# Patient Record
Sex: MALE | Race: WHITE
[De-identification: names, ages, dates, MRNs, and addresses within clinical notes are randomized per-mention and may not be internally consistent; named-entity substitution may affect disease eponyms.]

---

## 2017-03-09 ENCOUNTER — HOSPITAL ENCOUNTER (OUTPATIENT)
Dept: HOSPITAL 19 - SDCO | Age: 82
Discharge: HOME | End: 2017-03-09
Attending: SURGERY
Payer: MEDICARE

## 2017-03-09 VITALS — DIASTOLIC BLOOD PRESSURE: 47 MMHG | HEART RATE: 82 BPM | SYSTOLIC BLOOD PRESSURE: 121 MMHG

## 2017-03-09 VITALS — WEIGHT: 178.13 LBS | HEIGHT: 70.5 IN | BODY MASS INDEX: 25.22 KG/M2

## 2017-03-09 VITALS — SYSTOLIC BLOOD PRESSURE: 102 MMHG | HEART RATE: 76 BPM | DIASTOLIC BLOOD PRESSURE: 50 MMHG | TEMPERATURE: 98.8 F

## 2017-03-09 VITALS — DIASTOLIC BLOOD PRESSURE: 58 MMHG | HEART RATE: 72 BPM | SYSTOLIC BLOOD PRESSURE: 118 MMHG

## 2017-03-09 VITALS — TEMPERATURE: 97.3 F | HEART RATE: 67 BPM | DIASTOLIC BLOOD PRESSURE: 74 MMHG | SYSTOLIC BLOOD PRESSURE: 148 MMHG

## 2017-03-09 DIAGNOSIS — C44.529: Primary | ICD-10-CM

## 2017-05-16 ENCOUNTER — HOSPITAL ENCOUNTER (OUTPATIENT)
Dept: HOSPITAL 19 - WSPT | Age: 82
Discharge: HOME | End: 2017-05-16
Attending: INTERNAL MEDICINE
Payer: MEDICARE

## 2017-05-16 DIAGNOSIS — M25.561: Primary | ICD-10-CM

## 2017-05-16 DIAGNOSIS — M25.562: ICD-10-CM

## 2017-06-14 ENCOUNTER — HOSPITAL ENCOUNTER (OUTPATIENT)
Dept: HOSPITAL 19 - COL.LAB | Age: 82
End: 2017-06-14
Attending: ORTHOPAEDIC SURGERY
Payer: MEDICARE

## 2017-06-14 DIAGNOSIS — Z96.651: Primary | ICD-10-CM

## 2017-06-14 LAB
ERYTHROCYTE [DISTWIDTH] IN BLOOD BY AUTOMATED COUNT: 14.2 % (ref 11.5–14.5)
ERYTHROCYTE [SEDIMENTATION RATE] IN BLOOD: 1 MM/HR (ref 0–30)
HCT VFR BLD AUTO: 44.2 % (ref 42–52)
HGB BLD-MCNC: 13.7 G/DL (ref 13.5–18)
MCH RBC QN AUTO: 27 PG (ref 27–31)
MCHC RBC AUTO-ENTMCNC: 31 G/DL (ref 33–37)
MCV RBC AUTO: 87 FL (ref 80–100)
PLATELET # BLD AUTO: 197 K/MM3 (ref 130–400)
PMV BLD AUTO: 11 FL (ref 7.4–10.4)
RBC # BLD AUTO: 5.09 M/MM3 (ref 4.2–5.6)
WBC # BLD AUTO: 5.9 K/MM3 (ref 4.8–10.8)

## 2017-07-22 ENCOUNTER — HOSPITAL ENCOUNTER (EMERGENCY)
Dept: HOSPITAL 19 - COL.ER | Age: 82
Discharge: HOME | End: 2017-07-22
Payer: MEDICARE

## 2017-07-22 VITALS — HEIGHT: 70.98 IN | BODY MASS INDEX: 25.25 KG/M2 | WEIGHT: 180.34 LBS

## 2017-07-22 VITALS — SYSTOLIC BLOOD PRESSURE: 108 MMHG | HEART RATE: 78 BPM | DIASTOLIC BLOOD PRESSURE: 56 MMHG

## 2017-07-22 VITALS — TEMPERATURE: 97.9 F

## 2017-07-22 DIAGNOSIS — R53.1: Primary | ICD-10-CM

## 2017-07-22 DIAGNOSIS — R42: ICD-10-CM

## 2017-07-22 DIAGNOSIS — I10: ICD-10-CM

## 2017-07-22 LAB
ANION GAP SERPL CALC-SCNC: 9 MMOL/L (ref 7–16)
BUN SERPL-MCNC: 21 MG/DL (ref 9–20)
CALCIUM SERPL-MCNC: 9.1 MG/DL (ref 8.4–10.2)
CHLORIDE SERPL-SCNC: 105 MMOL/L (ref 98–107)
CO2 SERPL-SCNC: 26 MMOL/L (ref 22–30)
CREAT SERPL-SCNC: 0.95 MG/DL (ref 0.66–1.25)
GLUCOSE SERPL-MCNC: 216 MG/DL (ref 74–106)
POTASSIUM SERPL-SCNC: 4.1 MMOL/L (ref 3.4–5)
SODIUM SERPL-SCNC: 140 MMOL/L (ref 137–145)

## 2017-08-10 ENCOUNTER — HOSPITAL ENCOUNTER (OUTPATIENT)
Dept: HOSPITAL 19 - COL.VAS | Age: 82
End: 2017-08-10
Attending: INTERNAL MEDICINE
Payer: MEDICARE

## 2017-08-10 DIAGNOSIS — R59.0: Primary | ICD-10-CM

## 2017-08-10 DIAGNOSIS — R60.0: ICD-10-CM

## 2017-10-26 ENCOUNTER — HOSPITAL ENCOUNTER (OUTPATIENT)
Dept: HOSPITAL 19 - COL.RAD | Age: 82
End: 2017-10-26
Attending: NURSE PRACTITIONER
Payer: MEDICARE

## 2017-10-26 DIAGNOSIS — H53.8: ICD-10-CM

## 2017-10-26 DIAGNOSIS — S09.90XA: Primary | ICD-10-CM

## 2017-10-26 DIAGNOSIS — R41.840: ICD-10-CM

## 2017-11-13 ENCOUNTER — HOSPITAL ENCOUNTER (EMERGENCY)
Dept: HOSPITAL 19 - COL.ER | Age: 82
Discharge: HOME | End: 2017-11-13
Payer: MEDICARE

## 2017-11-13 VITALS — BODY MASS INDEX: 25.82 KG/M2 | WEIGHT: 180.34 LBS | HEIGHT: 70 IN

## 2017-11-13 VITALS — DIASTOLIC BLOOD PRESSURE: 72 MMHG | HEART RATE: 64 BPM | TEMPERATURE: 98.6 F | SYSTOLIC BLOOD PRESSURE: 135 MMHG

## 2017-11-13 DIAGNOSIS — E78.5: ICD-10-CM
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
## 2017-11-13 DIAGNOSIS — I10: ICD-10-CM

## 2017-11-13 DIAGNOSIS — N40.0: ICD-10-CM

## 2017-11-13 DIAGNOSIS — Z79.82: ICD-10-CM

## 2017-11-13 DIAGNOSIS — S86.912A: Primary | ICD-10-CM
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
## 2018-05-11 ENCOUNTER — HOSPITAL ENCOUNTER (OUTPATIENT)
Dept: HOSPITAL 19 - WSPT | Age: 83
LOS: 2 days | Discharge: HOME | End: 2018-05-13
Attending: INTERNAL MEDICINE
Payer: MEDICARE

## 2018-05-11 DIAGNOSIS — M19.90: Primary | ICD-10-CM

## 2018-06-09 ENCOUNTER — HOSPITAL ENCOUNTER (EMERGENCY)
Dept: HOSPITAL 19 - COL.ER | Age: 83
Discharge: HOME | End: 2018-06-09
Payer: MEDICARE

## 2018-06-09 VITALS — WEIGHT: 175.27 LBS | BODY MASS INDEX: 25.09 KG/M2 | HEIGHT: 70 IN

## 2018-06-09 VITALS — DIASTOLIC BLOOD PRESSURE: 71 MMHG | HEART RATE: 82 BPM | TEMPERATURE: 98.1 F | SYSTOLIC BLOOD PRESSURE: 136 MMHG

## 2018-06-09 DIAGNOSIS — H61.23: Primary | ICD-10-CM

## 2018-06-24 ENCOUNTER — HOSPITAL ENCOUNTER (EMERGENCY)
Dept: HOSPITAL 19 - COL.ER | Age: 83
LOS: 1 days | Discharge: HOME | End: 2018-06-25
Payer: MEDICARE

## 2018-06-24 VITALS — TEMPERATURE: 97.8 F

## 2018-06-24 VITALS — HEIGHT: 70 IN | BODY MASS INDEX: 25.09 KG/M2 | WEIGHT: 175.27 LBS

## 2018-06-24 DIAGNOSIS — I48.91: ICD-10-CM

## 2018-06-24 DIAGNOSIS — Z98.890: ICD-10-CM

## 2018-06-24 DIAGNOSIS — Z79.82: ICD-10-CM

## 2018-06-24 DIAGNOSIS — E78.5: ICD-10-CM

## 2018-06-24 DIAGNOSIS — I10: ICD-10-CM

## 2018-06-24 DIAGNOSIS — R42: Primary | ICD-10-CM

## 2018-06-24 DIAGNOSIS — N40.0: ICD-10-CM

## 2018-06-24 LAB
BASOPHILS # BLD: 0 10*3/UL (ref 0–0.2)
BASOPHILS NFR BLD AUTO: 0.5 % (ref 0–2)
EOSINOPHIL # BLD: 0.3 10*3/UL (ref 0–0.7)
EOSINOPHIL NFR BLD: 4.5 % (ref 0–4)
ERYTHROCYTE [DISTWIDTH] IN BLOOD BY AUTOMATED COUNT: 13.8 % (ref 11.5–14.5)
GRANULOCYTES # BLD AUTO: 52.4 % (ref 42.2–75.2)
HCT VFR BLD AUTO: 42.5 % (ref 42–52)
HGB BLD-MCNC: 13.3 G/DL (ref 13.5–18)
INR BLD: 1.1 (ref 0.8–3)
LYMPHOCYTES # BLD: 1.7 10*3/UL (ref 1.2–3.4)
LYMPHOCYTES NFR BLD: 26.6 % (ref 20–51)
MCH RBC QN AUTO: 27 PG (ref 27–31)
MCHC RBC AUTO-ENTMCNC: 31 G/DL (ref 33–37)
MCV RBC AUTO: 85 FL (ref 80–100)
MONOCYTES # BLD: 1 10*3/UL (ref 0.1–0.6)
MONOCYTES NFR BLD AUTO: 15.5 % (ref 1.7–9.3)
NEUTROPHILS # BLD: 3.3 10*3/UL (ref 1.4–6.5)
PLATELET # BLD AUTO: 214 K/MM3 (ref 130–400)
PMV BLD AUTO: 10.8 FL (ref 7.4–10.4)
PROTHROMBIN TIME: 13 SECONDS (ref 9.7–12.8)
RBC # BLD AUTO: 4.99 M/MM3 (ref 4.2–5.6)

## 2018-06-25 VITALS — HEART RATE: 67 BPM | DIASTOLIC BLOOD PRESSURE: 60 MMHG | SYSTOLIC BLOOD PRESSURE: 123 MMHG

## 2018-06-25 LAB
ALBUMIN SERPL-MCNC: 3.5 GM/DL (ref 3.5–5)
ALP SERPL-CCNC: 98 U/L (ref 50–136)
ALT SERPL-CCNC: 28 U/L (ref 21–72)
ANION GAP SERPL CALC-SCNC: 10 MMOL/L (ref 7–16)
AST SERPL-CCNC: 32 U/L (ref 15–37)
BILIRUB SERPL-MCNC: 0.6 MG/DL (ref 0–1)
BUN SERPL-MCNC: 19 MG/DL (ref 9–20)
CALCIUM SERPL-MCNC: 8.9 MG/DL (ref 8.4–10.2)
CHLORIDE SERPL-SCNC: 102 MMOL/L (ref 98–107)
CO2 SERPL-SCNC: 26 MMOL/L (ref 22–30)
CREAT SERPL-SCNC: 0.88 MG/DL (ref 0.66–1.25)
GLUCOSE SERPL-MCNC: 87 MG/DL (ref 74–106)
POTASSIUM SERPL-SCNC: 4.1 MMOL/L (ref 3.4–5)
PROT SERPL-MCNC: 6.7 GM/DL (ref 6.4–8.2)
SODIUM SERPL-SCNC: 139 MMOL/L (ref 137–145)
TROPONIN I SERPL-MCNC: < 0.012 NG/ML (ref 0–0.03)

## 2018-07-03 ENCOUNTER — HOSPITAL ENCOUNTER (OUTPATIENT)
Dept: HOSPITAL 19 - COL.RAD | Age: 83
End: 2018-07-03
Attending: ORTHOPAEDIC SURGERY
Payer: MEDICARE

## 2018-07-03 DIAGNOSIS — G31.89: Primary | ICD-10-CM

## 2018-07-03 PROCEDURE — A9585 GADOBUTROL INJECTION: HCPCS

## 2018-07-13 ENCOUNTER — HOSPITAL ENCOUNTER (OUTPATIENT)
Dept: HOSPITAL 19 - WSPT | Age: 83
LOS: 37 days | Discharge: HOME | End: 2018-08-19
Attending: INTERNAL MEDICINE
Payer: MEDICARE

## 2018-07-13 DIAGNOSIS — R29.898: Primary | ICD-10-CM

## 2018-07-13 DIAGNOSIS — R27.0: ICD-10-CM

## 2018-10-26 ENCOUNTER — HOSPITAL ENCOUNTER (OUTPATIENT)
Dept: HOSPITAL 19 - COL.LAB | Age: 83
End: 2018-10-26
Attending: INTERNAL MEDICINE
Payer: MEDICARE

## 2018-10-26 DIAGNOSIS — I49.5: Primary | ICD-10-CM

## 2018-10-26 LAB
ANION GAP SERPL CALC-SCNC: 3 MMOL/L (ref 7–16)
BUN SERPL-MCNC: 17 MG/DL (ref 9–20)
CALCIUM SERPL-MCNC: 8.6 MG/DL (ref 8.4–10.2)
CHLORIDE SERPL-SCNC: 105 MMOL/L (ref 98–107)
CO2 SERPL-SCNC: 32 MMOL/L (ref 22–30)
CREAT SERPL-SCNC: 0.75 MG/DL (ref 0.66–1.25)
ERYTHROCYTE [DISTWIDTH] IN BLOOD BY AUTOMATED COUNT: 13.2 % (ref 11.5–14.5)
GLUCOSE SERPL-MCNC: 90 MG/DL (ref 74–106)
HCT VFR BLD AUTO: 42.5 % (ref 42–52)
HGB BLD-MCNC: 13.3 G/DL (ref 13.5–18)
INR BLD: 1.2 (ref 0.8–3)
MCH RBC QN AUTO: 27 PG (ref 27–31)
MCHC RBC AUTO-ENTMCNC: 31 G/DL (ref 33–37)
MCV RBC AUTO: 88 FL (ref 80–100)
PLATELET # BLD AUTO: 181 K/MM3 (ref 130–400)
PMV BLD AUTO: 10.6 FL (ref 7.4–10.4)
POTASSIUM SERPL-SCNC: 4.5 MMOL/L (ref 3.4–5)
PROTHROMBIN TIME: 13.5 SECONDS (ref 9.7–12.8)
RBC # BLD AUTO: 4.85 M/MM3 (ref 4.2–5.6)
SODIUM SERPL-SCNC: 140 MMOL/L (ref 137–145)

## 2018-10-31 ENCOUNTER — HOSPITAL ENCOUNTER (OUTPATIENT)
Dept: HOSPITAL 19 - COL.RAD | Age: 83
End: 2018-10-31
Attending: INTERNAL MEDICINE
Payer: MEDICARE

## 2018-10-31 DIAGNOSIS — Z95.0: ICD-10-CM

## 2018-10-31 DIAGNOSIS — J98.6: Primary | ICD-10-CM

## 2018-12-18 ENCOUNTER — HOSPITAL ENCOUNTER (OUTPATIENT)
Dept: HOSPITAL 19 - COL.RAD | Age: 83
End: 2018-12-18
Attending: INTERNAL MEDICINE
Payer: MEDICARE

## 2018-12-18 DIAGNOSIS — M46.94: ICD-10-CM

## 2018-12-18 DIAGNOSIS — Z95.0: ICD-10-CM

## 2018-12-18 DIAGNOSIS — J84.10: Primary | ICD-10-CM

## 2018-12-18 DIAGNOSIS — J98.6: ICD-10-CM

## 2019-05-01 ENCOUNTER — HOSPITAL ENCOUNTER (OUTPATIENT)
Dept: HOSPITAL 19 - MKS.ESL.PT | Age: 84
LOS: 78 days | Discharge: HOME | End: 2019-07-18
Attending: ORTHOPAEDIC SURGERY
Payer: MEDICARE

## 2019-05-01 DIAGNOSIS — M25.562: ICD-10-CM

## 2019-05-01 DIAGNOSIS — Z96.651: ICD-10-CM

## 2019-05-01 DIAGNOSIS — M25.561: Primary | ICD-10-CM

## 2019-06-07 ENCOUNTER — HOSPITAL ENCOUNTER (OUTPATIENT)
Dept: HOSPITAL 19 - COL.RAD | Age: 84
End: 2019-06-07
Attending: ORTHOPAEDIC SURGERY
Payer: MEDICARE

## 2019-06-07 DIAGNOSIS — S76.012A: Primary | ICD-10-CM

## 2019-08-02 ENCOUNTER — HOSPITAL ENCOUNTER (EMERGENCY)
Dept: HOSPITAL 19 - COL.ER | Age: 84
Discharge: HOME | End: 2019-08-02
Payer: MEDICARE

## 2019-08-02 VITALS — SYSTOLIC BLOOD PRESSURE: 151 MMHG | HEART RATE: 85 BPM | DIASTOLIC BLOOD PRESSURE: 87 MMHG

## 2019-08-02 VITALS — TEMPERATURE: 97.5 F

## 2019-08-02 VITALS — BODY MASS INDEX: 25.09 KG/M2 | WEIGHT: 175.27 LBS | HEIGHT: 70 IN

## 2019-08-02 DIAGNOSIS — Z79.01: ICD-10-CM

## 2019-08-02 DIAGNOSIS — R42: ICD-10-CM

## 2019-08-02 DIAGNOSIS — I10: ICD-10-CM

## 2019-08-02 DIAGNOSIS — Z79.82: ICD-10-CM

## 2019-08-02 DIAGNOSIS — I48.91: ICD-10-CM

## 2019-08-02 DIAGNOSIS — E78.00: ICD-10-CM

## 2019-08-02 DIAGNOSIS — R53.1: Primary | ICD-10-CM

## 2019-08-02 DIAGNOSIS — Z95.0: ICD-10-CM

## 2019-08-02 LAB
ALBUMIN SERPL-MCNC: 3.9 GM/DL (ref 3.5–5)
ALP SERPL-CCNC: 102 U/L (ref 50–136)
ALT SERPL-CCNC: 20 U/L (ref 21–72)
ANION GAP SERPL CALC-SCNC: 8 MMOL/L (ref 7–16)
AST SERPL-CCNC: 34 U/L (ref 15–37)
BASOPHILS # BLD: 0 10*3/UL (ref 0–0.2)
BASOPHILS NFR BLD AUTO: 0.4 % (ref 0–2)
BILIRUB SERPL-MCNC: 0.8 MG/DL (ref 0–1)
BUN SERPL-MCNC: 14 MG/DL (ref 9–20)
CALCIUM SERPL-MCNC: 9 MG/DL (ref 8.4–10.2)
CHLORIDE SERPL-SCNC: 105 MMOL/L (ref 98–107)
CO2 SERPL-SCNC: 29 MMOL/L (ref 22–30)
CREAT SERPL-SCNC: 0.9 UMOL/L (ref 0.66–1.25)
CRP SERPL-MCNC: 0.5 MG/DL (ref 0–0.9)
EOSINOPHIL # BLD: 0.3 10*3/UL (ref 0–0.7)
EOSINOPHIL NFR BLD: 3.9 % (ref 0–4)
ERYTHROCYTE [DISTWIDTH] IN BLOOD BY AUTOMATED COUNT: 13.3 % (ref 11.5–14.5)
GLUCOSE SERPL-MCNC: 91 MG/DL (ref 74–106)
GRANULOCYTES # BLD AUTO: 63.1 % (ref 42.2–75.2)
HCT VFR BLD AUTO: 42.3 % (ref 42–52)
HGB BLD-MCNC: 13 G/DL (ref 13.5–18)
LYMPHOCYTES # BLD: 1.4 10*3/UL (ref 1.2–3.4)
LYMPHOCYTES NFR BLD: 20.4 % (ref 20–51)
MCH RBC QN AUTO: 27 PG (ref 27–31)
MCHC RBC AUTO-ENTMCNC: 31 G/DL (ref 33–37)
MCV RBC AUTO: 87 FL (ref 80–100)
MONOCYTES # BLD: 0.8 10*3/UL (ref 0.1–0.6)
MONOCYTES NFR BLD AUTO: 12.1 % (ref 1.7–9.3)
NEUTROPHILS # BLD: 4.4 10*3/UL (ref 1.4–6.5)
PH UR STRIP.AUTO: 5 [PH] (ref 5–8)
PLATELET # BLD AUTO: 178 K/MM3 (ref 130–400)
PMV BLD AUTO: 11.1 FL (ref 7.4–10.4)
POTASSIUM SERPL-SCNC: 4.2 MMOL/L (ref 3.4–5)
PROT SERPL-MCNC: 6.9 GM/DL (ref 6.4–8.2)
RBC # BLD AUTO: 4.88 M/MM3 (ref 4.2–5.6)
RBC # UR: (no result) /HPF
SODIUM SERPL-SCNC: 141 MMOL/L (ref 137–145)
SP GR UR STRIP.AUTO: 1.01 (ref 1–1.03)
TROPONIN I SERPL-MCNC: < 0.012 NG/ML (ref 0–0.04)
URN COLLECT METHOD CLASS: (no result)

## 2019-12-27 ENCOUNTER — HOSPITAL ENCOUNTER (EMERGENCY)
Dept: HOSPITAL 19 - COL.ER | Age: 84
LOS: 1 days | Discharge: HOME | End: 2019-12-28
Payer: MEDICARE

## 2019-12-27 VITALS — BODY MASS INDEX: 24.81 KG/M2 | WEIGHT: 173.28 LBS | HEIGHT: 70 IN

## 2019-12-27 DIAGNOSIS — Z96.651: ICD-10-CM

## 2019-12-27 DIAGNOSIS — Z79.82: ICD-10-CM

## 2019-12-27 DIAGNOSIS — Z95.0: ICD-10-CM

## 2019-12-27 DIAGNOSIS — E78.5: ICD-10-CM

## 2019-12-27 DIAGNOSIS — I25.10: ICD-10-CM

## 2019-12-27 DIAGNOSIS — J11.1: Primary | ICD-10-CM

## 2019-12-27 DIAGNOSIS — Z86.73: ICD-10-CM

## 2019-12-27 DIAGNOSIS — I10: ICD-10-CM

## 2019-12-27 LAB
ALBUMIN SERPL-MCNC: 3.9 GM/DL (ref 3.5–5)
ALP SERPL-CCNC: 114 U/L (ref 50–136)
ALT SERPL-CCNC: 34 U/L (ref 21–72)
ANION GAP SERPL CALC-SCNC: 9 MMOL/L (ref 7–16)
AST SERPL-CCNC: 53 U/L (ref 15–37)
BASOPHILS # BLD: 0 10*3/UL (ref 0–0.2)
BASOPHILS NFR BLD AUTO: 0.3 % (ref 0–2)
BILIRUB SERPL-MCNC: 0.7 MG/DL (ref 0–1)
BUN SERPL-MCNC: 15 MG/DL (ref 9–20)
CALCIUM SERPL-MCNC: 8.9 MG/DL (ref 8.4–10.2)
CHLORIDE SERPL-SCNC: 101 MMOL/L (ref 98–107)
CO2 SERPL-SCNC: 29 MMOL/L (ref 22–30)
CREAT SERPL-SCNC: 0.99 UMOL/L (ref 0.66–1.25)
CRP SERPL-MCNC: 16.9 MG/DL (ref 0–0.9)
EOSINOPHIL # BLD: 0.1 10*3/UL (ref 0–0.7)
EOSINOPHIL NFR BLD: 0.9 % (ref 0–4)
ERYTHROCYTE [DISTWIDTH] IN BLOOD BY AUTOMATED COUNT: 13.2 % (ref 11.5–14.5)
GLUCOSE SERPL-MCNC: 96 MG/DL (ref 74–106)
GRANULOCYTES # BLD AUTO: 73.8 % (ref 42.2–75.2)
HCT VFR BLD AUTO: 41.3 % (ref 42–52)
HGB BLD-MCNC: 12.6 G/DL (ref 13.5–18)
LYMPHOCYTES # BLD: 0.9 10*3/UL (ref 1.2–3.4)
LYMPHOCYTES NFR BLD: 9.4 % (ref 20–51)
MCH RBC QN AUTO: 27 PG (ref 27–31)
MCHC RBC AUTO-ENTMCNC: 31 G/DL (ref 33–37)
MCV RBC AUTO: 87 FL (ref 80–100)
MONOCYTES # BLD: 1.5 10*3/UL (ref 0.1–0.6)
MONOCYTES NFR BLD AUTO: 15.3 % (ref 1.7–9.3)
NEUTROPHILS # BLD: 7.2 10*3/UL (ref 1.4–6.5)
PLATELET # BLD AUTO: 225 K/MM3 (ref 130–400)
PMV BLD AUTO: 10.4 FL (ref 7.4–10.4)
POTASSIUM SERPL-SCNC: 4.4 MMOL/L (ref 3.4–5)
PROT SERPL-MCNC: 7.2 GM/DL (ref 6.4–8.2)
RBC # BLD AUTO: 4.73 M/MM3 (ref 4.2–5.6)
SODIUM SERPL-SCNC: 138 MMOL/L (ref 137–145)

## 2019-12-28 VITALS — SYSTOLIC BLOOD PRESSURE: 100 MMHG | DIASTOLIC BLOOD PRESSURE: 56 MMHG | HEART RATE: 84 BPM | TEMPERATURE: 97.6 F

## 2019-12-28 LAB
PH UR STRIP.AUTO: 5 [PH] (ref 5–8)
RBC # UR: (no result) /HPF
SP GR UR STRIP.AUTO: 1.01 (ref 1–1.03)
URN COLLECT METHOD CLASS: (no result)

## 2020-06-10 ENCOUNTER — HOSPITAL ENCOUNTER (OUTPATIENT)
Dept: HOSPITAL 19 - WSPT | Age: 85
LOS: 90 days | End: 2020-09-08
Attending: FAMILY MEDICINE
Payer: MEDICARE

## 2020-06-10 DIAGNOSIS — M25.561: Primary | ICD-10-CM

## 2020-06-10 DIAGNOSIS — M25.562: ICD-10-CM

## 2020-10-01 ENCOUNTER — HOSPITAL ENCOUNTER (INPATIENT)
Dept: HOSPITAL 19 - COL.ER | Age: 85
LOS: 15 days | Discharge: NURSING, REHAB OR LONG-TERM CARE | DRG: 417 | End: 2020-10-16
Attending: INTERNAL MEDICINE | Admitting: INTERNAL MEDICINE
Payer: MEDICARE

## 2020-10-01 VITALS — HEART RATE: 83 BPM | TEMPERATURE: 97.8 F | SYSTOLIC BLOOD PRESSURE: 138 MMHG | DIASTOLIC BLOOD PRESSURE: 62 MMHG

## 2020-10-01 VITALS — SYSTOLIC BLOOD PRESSURE: 152 MMHG | TEMPERATURE: 98.5 F | HEART RATE: 83 BPM | DIASTOLIC BLOOD PRESSURE: 67 MMHG

## 2020-10-01 VITALS — DIASTOLIC BLOOD PRESSURE: 86 MMHG | SYSTOLIC BLOOD PRESSURE: 162 MMHG | HEART RATE: 78 BPM | TEMPERATURE: 97.8 F

## 2020-10-01 VITALS — HEIGHT: 70.98 IN | WEIGHT: 192.24 LBS | BODY MASS INDEX: 26.91 KG/M2

## 2020-10-01 VITALS — TEMPERATURE: 98.7 F | HEART RATE: 83 BPM | SYSTOLIC BLOOD PRESSURE: 154 MMHG | DIASTOLIC BLOOD PRESSURE: 70 MMHG

## 2020-10-01 DIAGNOSIS — J96.01: ICD-10-CM

## 2020-10-01 DIAGNOSIS — L76.32: ICD-10-CM
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
## 2020-10-01 DIAGNOSIS — N40.1: ICD-10-CM

## 2020-10-01 DIAGNOSIS — Z79.01: ICD-10-CM

## 2020-10-01 DIAGNOSIS — R73.03: ICD-10-CM

## 2020-10-01 DIAGNOSIS — G47.00: ICD-10-CM

## 2020-10-01 DIAGNOSIS — I10: ICD-10-CM

## 2020-10-01 DIAGNOSIS — K85.10: Primary | ICD-10-CM

## 2020-10-01 DIAGNOSIS — N17.9: ICD-10-CM

## 2020-10-01 DIAGNOSIS — D64.9: ICD-10-CM

## 2020-10-01 DIAGNOSIS — D47.3: ICD-10-CM

## 2020-10-01 DIAGNOSIS — E87.6: ICD-10-CM

## 2020-10-01 DIAGNOSIS — R94.31: ICD-10-CM

## 2020-10-01 DIAGNOSIS — E78.5: ICD-10-CM

## 2020-10-01 DIAGNOSIS — I48.91: ICD-10-CM

## 2020-10-01 DIAGNOSIS — Z95.0: ICD-10-CM

## 2020-10-01 DIAGNOSIS — N32.81: ICD-10-CM

## 2020-10-01 LAB
ALBUMIN SERPL-MCNC: 4.3 GM/DL (ref 3.5–5)
ALP SERPL-CCNC: 114 U/L (ref 50–136)
ALT SERPL-CCNC: 20 U/L (ref 4–49)
ANION GAP SERPL CALC-SCNC: 10 MMOL/L (ref 7–16)
AST SERPL-CCNC: 63 U/L (ref 15–37)
BASOPHILS # BLD: 0 10*3/UL (ref 0–0.2)
BASOPHILS NFR BLD AUTO: 0.2 % (ref 0–2)
BILIRUB SERPL-MCNC: 1.1 MG/DL (ref 0–1)
BUN SERPL-MCNC: 19 MG/DL (ref 9–20)
CALCIUM SERPL-MCNC: 9.4 MG/DL (ref 8.4–10.2)
CHLORIDE SERPL-SCNC: 101 MMOL/L (ref 98–107)
CO2 SERPL-SCNC: 29 MMOL/L (ref 22–30)
CREAT SERPL-SCNC: 1.05 UMOL/L (ref 0.66–1.25)
CRP SERPL-MCNC: 0.8 MG/DL (ref 0–0.9)
EOSINOPHIL # BLD: 0.1 10*3/UL (ref 0–0.7)
EOSINOPHIL NFR BLD: 0.7 % (ref 0–4)
ERYTHROCYTE [DISTWIDTH] IN BLOOD BY AUTOMATED COUNT: 14 % (ref 11.5–14.5)
GLUCOSE SERPL-MCNC: 164 MG/DL (ref 74–106)
GRANULOCYTES # BLD AUTO: 83.8 % (ref 42.2–75.2)
HCT VFR BLD AUTO: 44.6 % (ref 42–52)
HGB BLD-MCNC: 13.4 G/DL (ref 13.5–18)
LYMPHOCYTES # BLD: 1.2 10*3/UL (ref 1.2–3.4)
LYMPHOCYTES NFR BLD: 6.9 % (ref 20–51)
MCH RBC QN AUTO: 26 PG (ref 27–31)
MCHC RBC AUTO-ENTMCNC: 30 G/DL (ref 33–37)
MCV RBC AUTO: 85 FL (ref 80–100)
MONOCYTES # BLD: 1.3 10*3/UL (ref 0.1–0.6)
MONOCYTES NFR BLD AUTO: 7.9 % (ref 1.7–9.3)
NEUTROPHILS # BLD: 14.2 10*3/UL (ref 1.4–6.5)
PLATELET # BLD AUTO: 242 K/MM3 (ref 130–400)
PMV BLD AUTO: 10.2 FL (ref 7.4–10.4)
POTASSIUM SERPL-SCNC: 4.2 MMOL/L (ref 3.4–5)
PROT SERPL-MCNC: 7.5 GM/DL (ref 6.4–8.2)
RBC # BLD AUTO: 5.25 M/MM3 (ref 4.2–5.6)
SODIUM SERPL-SCNC: 140 MMOL/L (ref 137–145)
TROPONIN I SERPL-MCNC: < 0.012 NG/ML (ref 0–0.04)

## 2020-10-01 PROCEDURE — P9016 RBC LEUKOCYTES REDUCED: HCPCS

## 2020-10-01 PROCEDURE — A9284 NON-ELECTRONIC SPIROMETER: HCPCS

## 2020-10-01 PROCEDURE — C9113 INJ PANTOPRAZOLE SODIUM, VIA: HCPCS

## 2020-10-01 NOTE — NUR
met with patient to discuss discharge planning. Patient lives
alone in [place] and sees Dr. Rawls for primary care. Patient has
medications mailed to his home by the [facility] and also utilizes [facility]
pharmacy as needed. Patient has a cane and walker at home but reports he
doesn't normally use those. Patient has [facility]PO[facility]-HC located in [facility] which
designates his brother Gregg (ph#[phone]), his daughter Christina Lucio
(ph#[phone]) and his other daughter, Mervat (ph#[phone]). Patient is
normally independent with ADLS and plans to return home at discharge. SW will
continue to follow as needed. denies tobacco, alcohol, or recreational substances

## 2020-10-01 NOTE — NUR
Patient doing well this afternoon. Has requested pain medication for abd pain,
medications given per orders. Dr. Renee in to see patient this afternoon.
Assisted patient to recliner, x 1 assist. Patient states he feels weak when
getting up out of bed. Fluids continue infusing per orders. Denies further
needs at this time. Will report off to night shift.

## 2020-10-01 NOTE — NUR
Resting in bed. Assessment complete. Bases diminshed bilaterally otherwise
clear. Heart sounds normal. Bowels active x4. Pulses present throughout. No
edema noted. IV left AC infusing without complications. Reports 7/10 ABD pain.
Provided with PRN morphine. Denies other needs. Call light in reach.
 
Respiratory started on 2 liters nasal cannula for saturations high 80s after
ambulation.

## 2020-10-01 NOTE — NUR
Patient up to room 347 by magdi, ambulated to bed with x 1 assist.
Oriented to room. Denies further needs at this time. Assessment complete.

## 2020-10-02 VITALS — TEMPERATURE: 98 F | SYSTOLIC BLOOD PRESSURE: 130 MMHG | HEART RATE: 85 BPM | DIASTOLIC BLOOD PRESSURE: 64 MMHG

## 2020-10-02 VITALS — HEART RATE: 73 BPM | SYSTOLIC BLOOD PRESSURE: 121 MMHG | TEMPERATURE: 98.4 F | DIASTOLIC BLOOD PRESSURE: 55 MMHG

## 2020-10-02 VITALS — DIASTOLIC BLOOD PRESSURE: 59 MMHG | TEMPERATURE: 98.5 F | SYSTOLIC BLOOD PRESSURE: 130 MMHG | HEART RATE: 85 BPM

## 2020-10-02 VITALS — HEART RATE: 65 BPM | SYSTOLIC BLOOD PRESSURE: 128 MMHG | TEMPERATURE: 98.8 F | DIASTOLIC BLOOD PRESSURE: 58 MMHG

## 2020-10-02 VITALS — HEART RATE: 83 BPM | DIASTOLIC BLOOD PRESSURE: 63 MMHG | SYSTOLIC BLOOD PRESSURE: 134 MMHG | TEMPERATURE: 98 F

## 2020-10-02 LAB
ALBUMIN SERPL-MCNC: 3.6 GM/DL (ref 3.5–5)
ALP SERPL-CCNC: 75 U/L (ref 50–136)
ALT SERPL-CCNC: 15 U/L (ref 4–49)
ANION GAP SERPL CALC-SCNC: 7 MMOL/L (ref 7–16)
AST SERPL-CCNC: 33 U/L (ref 15–37)
BILIRUB SERPL-MCNC: 1 MG/DL (ref 0–1)
BUN SERPL-MCNC: 18 MG/DL (ref 9–20)
CALCIUM SERPL-MCNC: 8.7 MG/DL (ref 8.4–10.2)
CHLORIDE SERPL-SCNC: 102 MMOL/L (ref 98–107)
CO2 SERPL-SCNC: 30 MMOL/L (ref 22–30)
CREAT SERPL-SCNC: 1.01 UMOL/L (ref 0.66–1.25)
ERYTHROCYTE [DISTWIDTH] IN BLOOD BY AUTOMATED COUNT: 14.5 % (ref 11.5–14.5)
GLUCOSE SERPL-MCNC: 115 MG/DL (ref 74–106)
HCT VFR BLD AUTO: 44.4 % (ref 42–52)
HGB BLD-MCNC: 13.5 G/DL (ref 13.5–18)
LYMPHOCYTES NFR BLD MANUAL: 2 % (ref 20–51)
MCH RBC QN AUTO: 26 PG (ref 27–31)
MCHC RBC AUTO-ENTMCNC: 30 G/DL (ref 33–37)
MCV RBC AUTO: 84 FL (ref 80–100)
MONOCYTES NFR BLD: 5 % (ref 1.7–9.3)
NEUTS BAND NFR BLD: 15 % (ref 0–10)
NEUTS SEG NFR BLD MANUAL: 78 % (ref 42–75.2)
PLATELET # BLD AUTO: 221 K/MM3 (ref 130–400)
PLATELET BLD QL SMEAR: NORMAL
PMV BLD AUTO: 11.2 FL (ref 7.4–10.4)
POTASSIUM SERPL-SCNC: 4.4 MMOL/L (ref 3.4–5)
PROT SERPL-MCNC: 6.4 GM/DL (ref 6.4–8.2)
RBC # BLD AUTO: 5.26 M/MM3 (ref 4.2–5.6)
SCHISTOCYTES BLD QL SMEAR: (no result)
SODIUM SERPL-SCNC: 139 MMOL/L (ref 137–145)

## 2020-10-02 NOTE — NUR
Patient in bed. Appears flushed, requested pain medicine for abd pain 5/10,
medications given per orders. Alert and oriented x 3. Assessment complete.
SCDs to BLE. Patient states he feels better than he did yesterday. Rechecked
temp at 98.1. Patient denies further needs at this time. Will continue to
monitor.

## 2020-10-02 NOTE — NUR
Patient has done well throughout the day, states he feels weak today. Bother
at bedside this afternoon. Fluids continue infusing per orders. Has been up to
recliner this afternoon, encouraged patient to increase activity. Denies pain
when sitting up in recliner. Denies further needs at this time. Will report
off to night shift.

## 2020-10-02 NOTE — NUR
Received report from Char RN's. Pt currently lying in bed. Pt
was having some pain but was given pain medication. Pt has his call light
within reach and his bed is in lowest position.

## 2020-10-02 NOTE — NUR
Patient required x3 doses of morphine and x1 dose of zofran with schedule
tylenol for pain control. Otherwise uneventful night. Resting in bed this AM.
Call light in reach.

## 2020-10-02 NOTE — NUR
Patient incontinent of urine. Cares provided. Reported nausea and ABD pain
4/10. Given PRN morphine and zofran. Denies other needs. Will monitor.

## 2020-10-03 VITALS — HEART RATE: 84 BPM | SYSTOLIC BLOOD PRESSURE: 117 MMHG | DIASTOLIC BLOOD PRESSURE: 49 MMHG | TEMPERATURE: 98.3 F

## 2020-10-03 VITALS — SYSTOLIC BLOOD PRESSURE: 131 MMHG | HEART RATE: 81 BPM | TEMPERATURE: 98.1 F | DIASTOLIC BLOOD PRESSURE: 47 MMHG

## 2020-10-03 VITALS — HEART RATE: 75 BPM | DIASTOLIC BLOOD PRESSURE: 60 MMHG | TEMPERATURE: 97.8 F | SYSTOLIC BLOOD PRESSURE: 112 MMHG

## 2020-10-03 VITALS — TEMPERATURE: 98.1 F | HEART RATE: 88 BPM | DIASTOLIC BLOOD PRESSURE: 64 MMHG | SYSTOLIC BLOOD PRESSURE: 130 MMHG

## 2020-10-03 VITALS — DIASTOLIC BLOOD PRESSURE: 64 MMHG | SYSTOLIC BLOOD PRESSURE: 111 MMHG

## 2020-10-03 VITALS — DIASTOLIC BLOOD PRESSURE: 62 MMHG | HEART RATE: 86 BPM | TEMPERATURE: 98.1 F | SYSTOLIC BLOOD PRESSURE: 134 MMHG

## 2020-10-03 VITALS — DIASTOLIC BLOOD PRESSURE: 58 MMHG | TEMPERATURE: 98.2 F | SYSTOLIC BLOOD PRESSURE: 105 MMHG | HEART RATE: 90 BPM

## 2020-10-03 VITALS — HEART RATE: 107 BPM | SYSTOLIC BLOOD PRESSURE: 107 MMHG | DIASTOLIC BLOOD PRESSURE: 78 MMHG | TEMPERATURE: 97.8 F

## 2020-10-03 VITALS — DIASTOLIC BLOOD PRESSURE: 89 MMHG | SYSTOLIC BLOOD PRESSURE: 110 MMHG

## 2020-10-03 LAB
ALBUMIN SERPL-MCNC: 3.2 GM/DL (ref 3.5–5)
ALP SERPL-CCNC: 81 U/L (ref 50–136)
ALT SERPL-CCNC: 13 U/L (ref 4–49)
ANION GAP SERPL CALC-SCNC: 5 MMOL/L (ref 7–16)
AST SERPL-CCNC: 31 U/L (ref 15–37)
BASOPHILS # BLD: 0 10*3/UL (ref 0–0.2)
BASOPHILS NFR BLD AUTO: 0.2 % (ref 0–2)
BILIRUB SERPL-MCNC: 1 MG/DL (ref 0–1)
BUN SERPL-MCNC: 20 MG/DL (ref 9–20)
CALCIUM SERPL-MCNC: 8.5 MG/DL (ref 8.4–10.2)
CHLORIDE SERPL-SCNC: 103 MMOL/L (ref 98–107)
CO2 SERPL-SCNC: 29 MMOL/L (ref 22–30)
CREAT SERPL-SCNC: 0.9 UMOL/L (ref 0.66–1.25)
EOSINOPHIL # BLD: 0 10*3/UL (ref 0–0.7)
EOSINOPHIL NFR BLD: 0.1 % (ref 0–4)
ERYTHROCYTE [DISTWIDTH] IN BLOOD BY AUTOMATED COUNT: 14.7 % (ref 11.5–14.5)
GLUCOSE SERPL-MCNC: 91 MG/DL (ref 74–106)
GRANULOCYTES # BLD AUTO: 89.6 % (ref 42.2–75.2)
HCT VFR BLD AUTO: 42.2 % (ref 42–52)
HGB BLD-MCNC: 13 G/DL (ref 13.5–18)
LYMPHOCYTES # BLD: 0.6 10*3/UL (ref 1.2–3.4)
LYMPHOCYTES NFR BLD: 2.7 % (ref 20–51)
MCH RBC QN AUTO: 26 PG (ref 27–31)
MCHC RBC AUTO-ENTMCNC: 31 G/DL (ref 33–37)
MCV RBC AUTO: 85 FL (ref 80–100)
MONOCYTES # BLD: 1.5 10*3/UL (ref 0.1–0.6)
MONOCYTES NFR BLD AUTO: 6.8 % (ref 1.7–9.3)
NEUTROPHILS # BLD: 19.3 10*3/UL (ref 1.4–6.5)
PLATELET # BLD AUTO: 190 K/MM3 (ref 130–400)
PMV BLD AUTO: 11.7 FL (ref 7.4–10.4)
POTASSIUM SERPL-SCNC: 4.1 MMOL/L (ref 3.4–5)
PROT SERPL-MCNC: 5.9 GM/DL (ref 6.4–8.2)
RBC # BLD AUTO: 4.95 M/MM3 (ref 4.2–5.6)
SODIUM SERPL-SCNC: 137 MMOL/L (ref 137–145)

## 2020-10-03 NOTE — NUR
Amrik BELL called this nurse saying that the patient had an unwitnessed fall.
Upon entering his room patient was sitting on the floor on the right side of
the bed. Patient states he was trying to go the the restroom. His urinal was
on the bedside. He denies hitting his head. He is alert and oriented. Noted to
have small abrasion on his right hand. Placed gaitbelt and helped patient
stand up and get back to bed. His pants and briefs are wet. He refuses to put
back a new briefs but we changed his pants.

## 2020-10-03 NOTE — NUR
Pt was given his scheduled dose of Tylenol and his Lopresor. Pt is currently
resting in bed. Pt has no complaints of chest pain, dizziness or pain. Pt was
given a warm blanket, pt stated that he is very comfortable in bed. ICU nurse
Flaquita was able to get his heart reate in the low 100's. Pt was pleased with
knowing that his heart rate was lower.

## 2020-10-03 NOTE — NUR
Received a call from Telemetry. Pt heart rate was as high as 154. Pt was
sleeping in bed. Pt vitals at this time is 111/67 and his heart rate is 139.
Goldie the hospitalist has been notified and she wants an EKG ordered at this
time. Pt is currently resting in bed and has his call light within reach. I am
currently at pt bed side he has no complaints of pain or discomfort at this
time.

## 2020-10-03 NOTE — NUR
Post fall vital signs were taken. Informed Goldie HENNING via phone call that
patient had unwitnessed fall and that he denies hitting his bed. Fall protocol
initiated. Bed alarm was placed awhile ago. Instructed patient he needs to
call us if he needs to go up and go to the bathroom.

## 2020-10-03 NOTE — NUR
Informed patient that Dr. Renee ordered NPO midnight. He's asking if he will
have the procedure tomorrow. Informed him that there are no orders yet and
maybe they will see how he goes well tonight and they will decide on in the
morning. Patient verbalizes understanding. He denies pain. He states his pain
was at 1/10.

## 2020-10-03 NOTE — NUR
Received report from Aries. Seen patient awake, lying in bed. With O2 at 1lpm
via NC. With IV on left AC infusing D5LR at 75ml/hr. He denies pain. He is
alert and oriented. Heart rate now was at 80's. Call light within reach.

## 2020-10-03 NOTE — NUR
Assessment completed, alert/oriented, vital signs stable, reports pain is
improved and mild this morning, abdomen is a little distended, BS+, heart
irregular/ patient went into A.fib RVR overnight, Hospitalist managed
medically and consulted cardiology, -130 this morning and they are aware
of this, patient is asymptomatic at [facility], WBC imrpoving, plan of care
discussed with Patient/family/hospitalist/SX and Cardiology

## 2020-10-03 NOTE — NUR
ICU Nurse Flaquita currently in with pt. Pt vitals are being monitored and
Goldie the hospitalist has been notified.

## 2020-10-04 VITALS — TEMPERATURE: 97.7 F | DIASTOLIC BLOOD PRESSURE: 50 MMHG | SYSTOLIC BLOOD PRESSURE: 96 MMHG | HEART RATE: 85 BPM

## 2020-10-04 VITALS — SYSTOLIC BLOOD PRESSURE: 114 MMHG | HEART RATE: 73 BPM | DIASTOLIC BLOOD PRESSURE: 50 MMHG

## 2020-10-04 VITALS — SYSTOLIC BLOOD PRESSURE: 137 MMHG | DIASTOLIC BLOOD PRESSURE: 91 MMHG | HEART RATE: 73 BPM

## 2020-10-04 VITALS — HEART RATE: 82 BPM | SYSTOLIC BLOOD PRESSURE: 142 MMHG | DIASTOLIC BLOOD PRESSURE: 74 MMHG | TEMPERATURE: 98.3 F

## 2020-10-04 VITALS — DIASTOLIC BLOOD PRESSURE: 72 MMHG | TEMPERATURE: 98.2 F | SYSTOLIC BLOOD PRESSURE: 144 MMHG | HEART RATE: 84 BPM

## 2020-10-04 VITALS — DIASTOLIC BLOOD PRESSURE: 62 MMHG | SYSTOLIC BLOOD PRESSURE: 134 MMHG | TEMPERATURE: 98.1 F | HEART RATE: 86 BPM

## 2020-10-04 VITALS — SYSTOLIC BLOOD PRESSURE: 166 MMHG | HEART RATE: 72 BPM | DIASTOLIC BLOOD PRESSURE: 72 MMHG

## 2020-10-04 VITALS — DIASTOLIC BLOOD PRESSURE: 56 MMHG | HEART RATE: 88 BPM | SYSTOLIC BLOOD PRESSURE: 98 MMHG

## 2020-10-04 VITALS — HEART RATE: 90 BPM | TEMPERATURE: 98.6 F | DIASTOLIC BLOOD PRESSURE: 75 MMHG | SYSTOLIC BLOOD PRESSURE: 90 MMHG

## 2020-10-04 VITALS — TEMPERATURE: 98 F | SYSTOLIC BLOOD PRESSURE: 152 MMHG | DIASTOLIC BLOOD PRESSURE: 74 MMHG | HEART RATE: 79 BPM

## 2020-10-04 VITALS — HEART RATE: 73 BPM | SYSTOLIC BLOOD PRESSURE: 89 MMHG | DIASTOLIC BLOOD PRESSURE: 57 MMHG

## 2020-10-04 VITALS — DIASTOLIC BLOOD PRESSURE: 64 MMHG | SYSTOLIC BLOOD PRESSURE: 91 MMHG | HEART RATE: 75 BPM

## 2020-10-04 LAB
ANION GAP SERPL CALC-SCNC: 4 MMOL/L (ref 7–16)
BUN SERPL-MCNC: 20 MG/DL (ref 9–20)
CALCIUM SERPL-MCNC: 8.4 MG/DL (ref 8.4–10.2)
CHLORIDE SERPL-SCNC: 102 MMOL/L (ref 98–107)
CO2 SERPL-SCNC: 32 MMOL/L (ref 22–30)
CREAT SERPL-SCNC: 0.86 UMOL/L (ref 0.66–1.25)
EOSINOPHIL NFR BLD: 2 % (ref 0–4)
ERYTHROCYTE [DISTWIDTH] IN BLOOD BY AUTOMATED COUNT: 14.6 % (ref 11.5–14.5)
GLUCOSE SERPL-MCNC: 92 MG/DL (ref 74–106)
HCT VFR BLD AUTO: 38 % (ref 42–52)
HGB BLD-MCNC: 11.7 G/DL (ref 13.5–18)
LIPASE SERPL-CCNC: 326 U/L (ref 23–300)
LYMPHOCYTES NFR BLD MANUAL: 2 % (ref 20–51)
MCH RBC QN AUTO: 26 PG (ref 27–31)
MCHC RBC AUTO-ENTMCNC: 31 G/DL (ref 33–37)
MCV RBC AUTO: 85 FL (ref 80–100)
MONOCYTES NFR BLD: 6 % (ref 1.7–9.3)
NEUTS BAND NFR BLD: 1 % (ref 0–10)
NEUTS SEG NFR BLD MANUAL: 89 % (ref 42–75.2)
PLATELET # BLD AUTO: 169 K/MM3 (ref 130–400)
PLATELET BLD QL SMEAR: NORMAL
PMV BLD AUTO: 11.3 FL (ref 7.4–10.4)
POTASSIUM SERPL-SCNC: 3.7 MMOL/L (ref 3.4–5)
RBC # BLD AUTO: 4.49 M/MM3 (ref 4.2–5.6)
SODIUM SERPL-SCNC: 138 MMOL/L (ref 137–145)

## 2020-10-04 PROCEDURE — 8E0W4CZ ROBOTIC ASSISTED PROCEDURE OF TRUNK REGION, PERCUTANEOUS ENDOSCOPIC APPROACH: ICD-10-PCS | Performed by: SURGERY

## 2020-10-04 PROCEDURE — 0FT44ZZ RESECTION OF GALLBLADDER, PERCUTANEOUS ENDOSCOPIC APPROACH: ICD-10-PCS | Performed by: SURGERY

## 2020-10-04 NOTE — NUR
POST-OP VITALS COMPLETE. PATIENT IS EATING, DRINKING AND VOIDING. PATIENT
REPORTS MILD ABDOMINAL DISCOMFORT AT THIS TIME. PATIENTS BLOOD PRESSURES ARE
SOFT POST-OP.  CALLED AND NOTIFIED THAT THE LAST BLOOD PRESSURE WAS
89/57 WITH THE DYNAMAP AND 96/50 WITH A MANUAL BLOOD PRESSURE CUFF. 
OKAY WITH THE PATIENTS BLOOD PRESSURES BEING IN THE 90'S. BEDSIDE REPORT GIVEN
TO EVELINA LOWE. CALL LIGHT IN REACH.

## 2020-10-04 NOTE — NUR
PATIENT SITTING UP IN THE CHAIR. THERAPY PRESENT IN THE ROOM. PO MEDICATIONS
GIVEN AT THIS TIME. IV ABX INFUSING. PATIENT CONSENT FORM FOR SURGERY SIGNED
AND PLACED ON PATIENT CHART. PATIENT GIVEN FLU VACCINE INFORMATION SHEET TO
REVIEW. PATIENT REPORTS MILD ABDOMINAL DISCOMFORT. CALL LIGHT IN REACH. NO
OTHER NEEDS AT THIS TIME.

## 2020-10-04 NOTE — NUR
PATIENT CONSENT FORM ON PATIENT CHART. PRE-OP MEDICATIONS GIVEN. LR HUNG TO
GRAVITY FLOW TUBING AND INFUSING TO LEFT AC IV. PATIENT TAKEN TO PERIOP VIA
BED BY EVELINA REYNA. WILL WAIT FOR PATIENT ARRIVAL BACK TO ROOM 347 POST-OP.

## 2020-10-04 NOTE — NUR
Report received, assumed care for night shift. Assessment complete. VS stable.
A&Ox3. Resting in bed watching TV. Lap sites x4 to abdomen-CDI. Denies
pain/nausea/shortness of breath. Plan of care discussed for this shift to
include HS meds/pain control/calling for needs. Verbalizes understanding. Call
light in reach/bed alarm on. WIll monitor.

## 2020-10-04 NOTE — NUR
PATIENT ARRIVED BACK TO ROOM 347 VIA BED FROM PACU. PATIENT IS DROWSY BUT
AWAKE. POST-OP VSS. PATIENT DENIES ANY PAIN AT THIS TIME. ABDOMINAL LAP
SITES X4 DRESSED WITH BANDAIDS AND ARE CD&I. SCD'S TO BLE. CALL LIGHT WITHIN
REACH. BROTHER PRESENT AT THE BEDSIDE. PATIENT DENIES ANY OTHER NEEDS AT THIS
TIME.

## 2020-10-04 NOTE — NUR
Dannielle received a call from patients nurse reporting that patient had been week,
and is scheduled for surgery at 11 am. Nurse indicated that patients brother
(who lives in [place]) wished to discuss Inpatient rehab options, as the patient
currently lives alone and may need continued treatment after surgery. DANNIELLE met
with patient and patients brother Gregg [phone] to discuss options.
Discussed IPR services, and DANNIELLE took down information and contacted [place]
who scheduled an appointment to meet with eden ravi and his brother at 2:30.
DANNIELLE provider information to Gregg as his brother was in surgery.

## 2020-10-04 NOTE — NUR
Patient had no episode of Tachycardia overnight. He denies pain. Maintained on
NPO. Bed alarm on. Call light within reach.

## 2020-10-05 VITALS — TEMPERATURE: 98.1 F | HEART RATE: 75 BPM | SYSTOLIC BLOOD PRESSURE: 105 MMHG | DIASTOLIC BLOOD PRESSURE: 58 MMHG

## 2020-10-05 VITALS — SYSTOLIC BLOOD PRESSURE: 103 MMHG | HEART RATE: 86 BPM | TEMPERATURE: 98 F | DIASTOLIC BLOOD PRESSURE: 47 MMHG

## 2020-10-05 VITALS — TEMPERATURE: 98.1 F | SYSTOLIC BLOOD PRESSURE: 101 MMHG | HEART RATE: 119 BPM | DIASTOLIC BLOOD PRESSURE: 57 MMHG

## 2020-10-05 VITALS — SYSTOLIC BLOOD PRESSURE: 117 MMHG | DIASTOLIC BLOOD PRESSURE: 54 MMHG | TEMPERATURE: 98.6 F | HEART RATE: 93 BPM

## 2020-10-05 VITALS — TEMPERATURE: 97.8 F | SYSTOLIC BLOOD PRESSURE: 119 MMHG | DIASTOLIC BLOOD PRESSURE: 57 MMHG | HEART RATE: 86 BPM

## 2020-10-05 VITALS — DIASTOLIC BLOOD PRESSURE: 54 MMHG | HEART RATE: 63 BPM | TEMPERATURE: 98.3 F | SYSTOLIC BLOOD PRESSURE: 95 MMHG

## 2020-10-05 VITALS — DIASTOLIC BLOOD PRESSURE: 50 MMHG | SYSTOLIC BLOOD PRESSURE: 96 MMHG

## 2020-10-05 VITALS — HEART RATE: 79 BPM | SYSTOLIC BLOOD PRESSURE: 111 MMHG | TEMPERATURE: 97.8 F | DIASTOLIC BLOOD PRESSURE: 62 MMHG

## 2020-10-05 LAB
ALBUMIN SERPL-MCNC: 2.8 GM/DL (ref 3.5–5)
ALP SERPL-CCNC: 111 U/L (ref 50–136)
ALT SERPL-CCNC: 34 U/L (ref 4–49)
ANION GAP SERPL CALC-SCNC: 7 MMOL/L (ref 7–16)
AST SERPL-CCNC: 58 U/L (ref 15–37)
BILIRUB SERPL-MCNC: 0.9 MG/DL (ref 0–1)
BUN SERPL-MCNC: 34 MG/DL (ref 9–20)
CALCIUM SERPL-MCNC: 8.2 MG/DL (ref 8.4–10.2)
CHLORIDE SERPL-SCNC: 101 MMOL/L (ref 98–107)
CO2 SERPL-SCNC: 29 MMOL/L (ref 22–30)
CREAT SERPL-SCNC: 1.59 UMOL/L (ref 0.66–1.25)
ERYTHROCYTE [DISTWIDTH] IN BLOOD BY AUTOMATED COUNT: 14.5 % (ref 11.5–14.5)
GLUCOSE SERPL-MCNC: 160 MG/DL (ref 74–106)
HCT VFR BLD AUTO: 27.8 % (ref 42–52)
HCT VFR BLD AUTO: 28.8 % (ref 42–52)
HGB BLD-MCNC: 8.5 G/DL (ref 13.5–18)
HGB BLD-MCNC: 8.8 G/DL (ref 13.5–18)
LYMPHOCYTES NFR BLD MANUAL: 2 % (ref 20–51)
MCH RBC QN AUTO: 26 PG (ref 27–31)
MCHC RBC AUTO-ENTMCNC: 31 G/DL (ref 33–37)
MCV RBC AUTO: 86 FL (ref 80–100)
METAMYELOCYTES NFR BLD MANUAL: 2 % (ref 0–0)
MONOCYTES NFR BLD: 1 % (ref 1.7–9.3)
NEUTS BAND NFR BLD: 4 % (ref 0–10)
NEUTS SEG NFR BLD MANUAL: 91 % (ref 42–75.2)
PLATELET # BLD AUTO: 208 K/MM3 (ref 130–400)
PLATELET BLD QL SMEAR: NORMAL
PMV BLD AUTO: 11.5 FL (ref 7.4–10.4)
POTASSIUM SERPL-SCNC: 4 MMOL/L (ref 3.4–5)
PROT SERPL-MCNC: 5.5 GM/DL (ref 6.4–8.2)
RBC # BLD AUTO: 3.34 M/MM3 (ref 4.2–5.6)
SODIUM SERPL-SCNC: 138 MMOL/L (ref 137–145)

## 2020-10-05 NOTE — NUR
Patient has done well throughout the day, has been up to recliner. Fluids
continue infusing per order. Denies further needs at this time. Will report
off to night shift.

## 2020-10-05 NOTE — NUR
Patient in bed eating breakfast. Alert and oriented x 3. Denies pain at this
time. SCDs to BLE. Lap sites x 5 with bandaids are CDI. Fluids infusing per
orders to left AC IV. Denies further needs at this time.

## 2020-10-05 NOTE — NUR
met with patient and patient's brother, Gregg to discuss
discharge planning. Patient advised he would like to get to [facility] but that his second preference would be [facility]. ZABRINA
contacted Leesa [facility] Hospital Director who continues to review referral. ZABRINA also faxed
a referral to Dodie at [facility] and will continue to follow.

## 2020-10-05 NOTE — NUR
Pt. sitting up in bed at this time. Pt. is A&OX3, assessment complete. IV to
lt. ac patent, IV fluids infusing per orders.  Abd. lap sites x4 with
bandaids.  Pt. reports pain at a 5 on pain scale, gave pain meds per orders.
Pt. denies further needs, call light within reach.

## 2020-10-06 VITALS — TEMPERATURE: 98.1 F | HEART RATE: 78 BPM | SYSTOLIC BLOOD PRESSURE: 107 MMHG | DIASTOLIC BLOOD PRESSURE: 52 MMHG

## 2020-10-06 VITALS — TEMPERATURE: 97.8 F | DIASTOLIC BLOOD PRESSURE: 60 MMHG | SYSTOLIC BLOOD PRESSURE: 128 MMHG | HEART RATE: 82 BPM

## 2020-10-06 VITALS — HEART RATE: 127 BPM | TEMPERATURE: 97.6 F | DIASTOLIC BLOOD PRESSURE: 52 MMHG | SYSTOLIC BLOOD PRESSURE: 101 MMHG

## 2020-10-06 VITALS — TEMPERATURE: 97.5 F | DIASTOLIC BLOOD PRESSURE: 46 MMHG | HEART RATE: 81 BPM | SYSTOLIC BLOOD PRESSURE: 100 MMHG

## 2020-10-06 VITALS — TEMPERATURE: 97.3 F | HEART RATE: 76 BPM | SYSTOLIC BLOOD PRESSURE: 97 MMHG | DIASTOLIC BLOOD PRESSURE: 52 MMHG

## 2020-10-06 LAB
ANION GAP SERPL CALC-SCNC: 3 MMOL/L (ref 7–16)
BUN SERPL-MCNC: 41 MG/DL (ref 9–20)
CALCIUM SERPL-MCNC: 7.9 MG/DL (ref 8.4–10.2)
CHLORIDE SERPL-SCNC: 105 MMOL/L (ref 98–107)
CO2 SERPL-SCNC: 31 MMOL/L (ref 22–30)
CREAT SERPL-SCNC: 1.22 UMOL/L (ref 0.66–1.25)
ERYTHROCYTE [DISTWIDTH] IN BLOOD BY AUTOMATED COUNT: 14.4 % (ref 11.5–14.5)
GLUCOSE SERPL-MCNC: 128 MG/DL (ref 74–106)
HCT VFR BLD AUTO: 22.3 % (ref 42–52)
HCT VFR BLD AUTO: 24.2 % (ref 42–52)
HCT VFR BLD AUTO: 24.7 % (ref 42–52)
HGB BLD-MCNC: 6.8 G/DL (ref 13.5–18)
HGB BLD-MCNC: 7.4 G/DL (ref 13.5–18)
HGB BLD-MCNC: 7.4 G/DL (ref 13.5–18)
LYMPHOCYTES NFR BLD MANUAL: 6 % (ref 20–51)
MCH RBC QN AUTO: 26 PG (ref 27–31)
MCHC RBC AUTO-ENTMCNC: 31 G/DL (ref 33–37)
MCV RBC AUTO: 85 FL (ref 80–100)
MONOCYTES NFR BLD: 5 % (ref 1.7–9.3)
NEUTS BAND NFR BLD: 9 % (ref 0–10)
NEUTS SEG NFR BLD MANUAL: 80 % (ref 42–75.2)
PLATELET # BLD AUTO: 190 K/MM3 (ref 130–400)
PLATELET BLD QL SMEAR: NORMAL
PMV BLD AUTO: 10.5 FL (ref 7.4–10.4)
POTASSIUM SERPL-SCNC: 3.9 MMOL/L (ref 3.4–5)
RBC # BLD AUTO: 2.64 M/MM3 (ref 4.2–5.6)
SODIUM SERPL-SCNC: 139 MMOL/L (ref 137–145)

## 2020-10-06 NOTE — NUR
faxed updates to Dodie at [facility] and was advised they can
accept referral. SW will continue to follow.

## 2020-10-06 NOTE — NUR
Patient in sitting up in bed resting. Alert and oriented x 3. Assessment
complete. Eccymosis noted to right flank. Patient has minimal drainage to
incision under umbilicus. Other lap sites are CDI. Patient denies pain at this
time. Denies further needs at this time.

## 2020-10-06 NOTE — NUR
Patient has done well throughout the day. Denies pain throughout the day, has
been up to recliner through the day. Ambulates with stand by assist and
walker, steady gait. Denies further needs at this time. Will report off to
night shift.

## 2020-10-06 NOTE — NUR
Patient in bed resting, denies further needs at this time. Assisted patient to
call and change diet order.

## 2020-10-06 NOTE — NUR
Pt. laying in bed at this time. Pt. is A&OX3, assessment complete. INT to lt.
ac patent.  Pt. denies pain or other needs, call light within reach.

## 2020-10-06 NOTE — NUR
Notified Felicia RICHARDSON, patient HGB at 6.8, tachycardic at 120's. New orders
entered, contacted RT for EKG.

## 2020-10-07 VITALS — HEART RATE: 89 BPM | TEMPERATURE: 98.9 F | DIASTOLIC BLOOD PRESSURE: 55 MMHG | SYSTOLIC BLOOD PRESSURE: 119 MMHG

## 2020-10-07 VITALS — HEART RATE: 84 BPM | SYSTOLIC BLOOD PRESSURE: 130 MMHG | DIASTOLIC BLOOD PRESSURE: 42 MMHG | TEMPERATURE: 99.2 F

## 2020-10-07 VITALS — TEMPERATURE: 98.5 F | SYSTOLIC BLOOD PRESSURE: 123 MMHG | HEART RATE: 82 BPM | DIASTOLIC BLOOD PRESSURE: 68 MMHG

## 2020-10-07 VITALS — DIASTOLIC BLOOD PRESSURE: 64 MMHG | SYSTOLIC BLOOD PRESSURE: 128 MMHG | TEMPERATURE: 98.5 F | HEART RATE: 86 BPM

## 2020-10-07 VITALS — HEART RATE: 84 BPM | DIASTOLIC BLOOD PRESSURE: 62 MMHG | TEMPERATURE: 98.4 F | SYSTOLIC BLOOD PRESSURE: 134 MMHG

## 2020-10-07 VITALS — HEART RATE: 74 BPM | SYSTOLIC BLOOD PRESSURE: 108 MMHG | DIASTOLIC BLOOD PRESSURE: 55 MMHG | TEMPERATURE: 98.8 F

## 2020-10-07 LAB
ALBUMIN SERPL-MCNC: 3 GM/DL (ref 3.5–5)
ALP SERPL-CCNC: 112 U/L (ref 50–136)
ALT SERPL-CCNC: 37 U/L (ref 4–49)
ANION GAP SERPL CALC-SCNC: 5 MMOL/L (ref 7–16)
AST SERPL-CCNC: 51 U/L (ref 15–37)
BILIRUB SERPL-MCNC: 0.9 MG/DL (ref 0–1)
BUN SERPL-MCNC: 29 MG/DL (ref 9–20)
CALCIUM SERPL-MCNC: 8.4 MG/DL (ref 8.4–10.2)
CHLORIDE SERPL-SCNC: 105 MMOL/L (ref 98–107)
CO2 SERPL-SCNC: 30 MMOL/L (ref 22–30)
CREAT SERPL-SCNC: 1 UMOL/L (ref 0.66–1.25)
EOSINOPHIL NFR BLD: 8 % (ref 0–4)
ERYTHROCYTE [DISTWIDTH] IN BLOOD BY AUTOMATED COUNT: 14.6 % (ref 11.5–14.5)
GLUCOSE SERPL-MCNC: 105 MG/DL (ref 74–106)
HCT VFR BLD AUTO: 25.7 % (ref 42–52)
HGB BLD-MCNC: 7.8 G/DL (ref 13.5–18)
LYMPHOCYTES NFR BLD MANUAL: 5 % (ref 20–51)
MCH RBC QN AUTO: 25 PG (ref 27–31)
MCHC RBC AUTO-ENTMCNC: 30 G/DL (ref 33–37)
MCV RBC AUTO: 84 FL (ref 80–100)
METAMYELOCYTES NFR BLD MANUAL: 2 % (ref 0–0)
MONOCYTES NFR BLD: 6 % (ref 1.7–9.3)
NEUTS BAND NFR BLD: 5 % (ref 0–10)
NEUTS SEG NFR BLD MANUAL: 74 % (ref 42–75.2)
PLATELET # BLD AUTO: 260 K/MM3 (ref 130–400)
PLATELET BLD QL SMEAR: NORMAL
PMV BLD AUTO: 10.4 FL (ref 7.4–10.4)
POTASSIUM SERPL-SCNC: 3.3 MMOL/L (ref 3.4–5)
PROT SERPL-MCNC: 5.8 GM/DL (ref 6.4–8.2)
RBC # BLD AUTO: 3.07 M/MM3 (ref 4.2–5.6)
SODIUM SERPL-SCNC: 141 MMOL/L (ref 137–145)

## 2020-10-07 NOTE — NUR
collaborated with Leesa, IPR Director who advised at this time
patient is too functional for IPR. SW met with patient to provide update.
Patient states he is agreeable to go to [facility] for a skilled stay. ZABRINA
contacted Dodie at [facility] to provide update and will continue to follow.

## 2020-10-07 NOTE — NUR
Linda nurse woke up patient to give his night meds and antibiotic. INT on left
AC flushes well but noted to have some leaking. Repositioned his IV site,
tightened the tube and changed the dressing.

## 2020-10-07 NOTE — NUR
This nurse saw the patient trying to get out of his bed. When asked where is
he going, he said he needs to go to the bathroom. Gait belt utilized and
assisted him with the help of his walker. He had loose bowel movement.We
changed his briefs as well and assisted him back to bed. Bed alarm on. Call
light within reach.

## 2020-10-07 NOTE — NUR
Patient sat up in the chair most the day. No complaints of pain or nausea. He
walked in the hallways 4 times today. Twice with PT and twice staff. He is
weak and it is a lot of work for him to get up and move. He has been having
loose stools, he stated that is normal for him. He is upset that his visitor
left and he can't get a different one this stay. He has been eating drinking
well today. He was started on shakes and he tolerated them well. He is hoping
to be discharged to [facility] but is will to go to where every he is accepted. No
other changes at this time. Call light within reach. Patient is back in bed at
this time. Bed alarm on.

## 2020-10-08 VITALS — DIASTOLIC BLOOD PRESSURE: 63 MMHG | SYSTOLIC BLOOD PRESSURE: 123 MMHG | HEART RATE: 88 BPM | TEMPERATURE: 99.2 F

## 2020-10-08 VITALS — TEMPERATURE: 98.2 F | HEART RATE: 88 BPM | DIASTOLIC BLOOD PRESSURE: 66 MMHG | SYSTOLIC BLOOD PRESSURE: 131 MMHG

## 2020-10-08 VITALS — HEART RATE: 87 BPM | TEMPERATURE: 99 F | SYSTOLIC BLOOD PRESSURE: 143 MMHG | DIASTOLIC BLOOD PRESSURE: 59 MMHG

## 2020-10-08 VITALS — TEMPERATURE: 97.8 F | HEART RATE: 61 BPM | SYSTOLIC BLOOD PRESSURE: 149 MMHG | DIASTOLIC BLOOD PRESSURE: 63 MMHG

## 2020-10-08 VITALS — SYSTOLIC BLOOD PRESSURE: 142 MMHG | DIASTOLIC BLOOD PRESSURE: 74 MMHG | HEART RATE: 90 BPM | TEMPERATURE: 98.5 F

## 2020-10-08 VITALS — SYSTOLIC BLOOD PRESSURE: 123 MMHG | DIASTOLIC BLOOD PRESSURE: 64 MMHG | TEMPERATURE: 98.4 F | HEART RATE: 85 BPM

## 2020-10-08 VITALS — SYSTOLIC BLOOD PRESSURE: 112 MMHG | TEMPERATURE: 98 F | HEART RATE: 69 BPM | DIASTOLIC BLOOD PRESSURE: 76 MMHG

## 2020-10-08 LAB
ALBUMIN SERPL-MCNC: 2.5 GM/DL (ref 3.5–5)
ALP SERPL-CCNC: 89 U/L (ref 50–136)
ALT SERPL-CCNC: 29 U/L (ref 4–49)
ANION GAP SERPL CALC-SCNC: 5 MMOL/L (ref 7–16)
AST SERPL-CCNC: 37 U/L (ref 15–37)
BILIRUB SERPL-MCNC: 1 MG/DL (ref 0–1)
BUN SERPL-MCNC: 21 MG/DL (ref 9–20)
CALCIUM SERPL-MCNC: 8 MG/DL (ref 8.4–10.2)
CHLORIDE SERPL-SCNC: 105 MMOL/L (ref 98–107)
CO2 SERPL-SCNC: 29 MMOL/L (ref 22–30)
CREAT SERPL-SCNC: 0.89 UMOL/L (ref 0.66–1.25)
EOSINOPHIL NFR BLD: 1 % (ref 0–4)
ERYTHROCYTE [DISTWIDTH] IN BLOOD BY AUTOMATED COUNT: 14.7 % (ref 11.5–14.5)
GLUCOSE SERPL-MCNC: 108 MG/DL (ref 74–106)
HCT VFR BLD AUTO: 23.3 % (ref 42–52)
HGB BLD-MCNC: 7.2 G/DL (ref 13.5–18)
HYPOCHROMIA BLD QL SMEAR: (no result)
LYMPHOCYTES NFR BLD MANUAL: 9 % (ref 20–51)
MCH RBC QN AUTO: 26 PG (ref 27–31)
MCHC RBC AUTO-ENTMCNC: 31 G/DL (ref 33–37)
MCV RBC AUTO: 84 FL (ref 80–100)
METAMYELOCYTES NFR BLD MANUAL: 2 % (ref 0–0)
MONOCYTES NFR BLD: 7 % (ref 1.7–9.3)
NEUTS BAND NFR BLD: 3 % (ref 0–10)
NEUTS SEG NFR BLD MANUAL: 78 % (ref 42–75.2)
PH UR STRIP.AUTO: 5 [PH] (ref 5–8)
PLATELET # BLD AUTO: 242 K/MM3 (ref 130–400)
PLATELET BLD QL SMEAR: NORMAL
PMV BLD AUTO: 10.4 FL (ref 7.4–10.4)
POTASSIUM SERPL-SCNC: 3.2 MMOL/L (ref 3.4–5)
PROT SERPL-MCNC: 5 GM/DL (ref 6.4–8.2)
RBC # BLD AUTO: 2.76 M/MM3 (ref 4.2–5.6)
RBC # UR: (no result) /HPF
SODIUM SERPL-SCNC: 139 MMOL/L (ref 137–145)
SP GR UR STRIP.AUTO: 1.02 (ref 1–1.03)
SQUAMOUS # URNS: (no result) /HPF
UA DIPSTICK PNL UR STRIP.AUTO: (no result)
URN COLLECT METHOD CLASS: (no result)

## 2020-10-08 NOTE — NUR
collaborated with Hospitalist who advised plan is not for
discharge today. SW requested an additional COVID test as patient's current
negative will be out of the 72 hour window this afternoon. ZABRINA contacted
Dodie at [facility] and faxed updates. ZABRINA to continue to follow.

## 2020-10-08 NOTE — NUR
Patient has been doing ok today. He walked with PT and sat up in the chair
most the morning but wanted to back to bed. He continues to be weak today.
Offered to help his shower but he did not think he could tolerate it. He
stated his appetite is deacreased and he does not feel much like eating. His
brother called, discussed with him how the patients care is. He wanted to
speak with the doctor, notified Dr Mullen. No other changes at this time.
Call light within reach.

## 2020-10-08 NOTE — NUR
Patient stayed in bed most the afternoon. He did not want to eat supper. He
would not get up to chair anymore this afternoon. He did eat a jello and drank
some grape juice. No complaints of nausea. NAOMIE RICHARDSON given phone number
earlier this afternoon to update brother. No other changes at this time. Call
light bull goldman.

## 2020-10-08 NOTE — NUR
This nurse is about to give the Zosyn but during flushing of his INT it was
leaking. Tried to reposition the INT but it was still leaking. Re-inserted
another IV site on his right forearm, G22.

## 2020-10-08 NOTE — NUR
Pt. laying in bed at this time. Pt. is A&OX3, assessment complete.  INT to rt.
wrist patent.  Pt. denies pain.  Abd. lap sites well approximated.  Pt. denies
further needs, call light within reach.

## 2020-10-08 NOTE — NUR
This nurse saw the patient trying to get out of bed. When asked where is he
going he said he needs to go to the bathroom. Gait belt utilized and assisted
him the the help of his walker. He had loose bowel movement. Briefs changed
and assisted him back to bed. Bed alarm on. Call light within reach.

## 2020-10-08 NOTE — NUR
Patient had uneventful night. Last dose of Zosyn at 0930H. He had 2 times
loose bowel movement. He denies pain. He's afebrile. Heart rate has been at
80's.

## 2020-10-09 VITALS — HEART RATE: 72 BPM | SYSTOLIC BLOOD PRESSURE: 105 MMHG | TEMPERATURE: 98.8 F | DIASTOLIC BLOOD PRESSURE: 54 MMHG

## 2020-10-09 VITALS — SYSTOLIC BLOOD PRESSURE: 148 MMHG | TEMPERATURE: 98.2 F | HEART RATE: 84 BPM | DIASTOLIC BLOOD PRESSURE: 67 MMHG

## 2020-10-09 VITALS — SYSTOLIC BLOOD PRESSURE: 139 MMHG | TEMPERATURE: 98.6 F | HEART RATE: 89 BPM | DIASTOLIC BLOOD PRESSURE: 73 MMHG

## 2020-10-09 VITALS — DIASTOLIC BLOOD PRESSURE: 58 MMHG | TEMPERATURE: 98.3 F | HEART RATE: 80 BPM | SYSTOLIC BLOOD PRESSURE: 115 MMHG

## 2020-10-09 VITALS — SYSTOLIC BLOOD PRESSURE: 141 MMHG | TEMPERATURE: 98.3 F | DIASTOLIC BLOOD PRESSURE: 92 MMHG | HEART RATE: 87 BPM

## 2020-10-09 VITALS — HEART RATE: 93 BPM | DIASTOLIC BLOOD PRESSURE: 54 MMHG | TEMPERATURE: 99.1 F | SYSTOLIC BLOOD PRESSURE: 121 MMHG

## 2020-10-09 LAB
ANION GAP SERPL CALC-SCNC: 6 MMOL/L (ref 7–16)
BUN SERPL-MCNC: 18 MG/DL (ref 9–20)
CALCIUM SERPL-MCNC: 8.5 MG/DL (ref 8.4–10.2)
CHLORIDE SERPL-SCNC: 103 MMOL/L (ref 98–107)
CO2 SERPL-SCNC: 31 MMOL/L (ref 22–30)
CREAT SERPL-SCNC: 0.86 UMOL/L (ref 0.66–1.25)
EOSINOPHIL NFR BLD: 2 % (ref 0–4)
ERYTHROCYTE [DISTWIDTH] IN BLOOD BY AUTOMATED COUNT: 14.4 % (ref 11.5–14.5)
GLUCOSE SERPL-MCNC: 127 MG/DL (ref 74–106)
HCT VFR BLD AUTO: 24.4 % (ref 42–52)
HGB BLD-MCNC: 7.5 G/DL (ref 13.5–18)
HYPOCHROMIA BLD QL SMEAR: (no result)
LYMPHOCYTES NFR BLD MANUAL: 14 % (ref 20–51)
MCH RBC QN AUTO: 26 PG (ref 27–31)
MCHC RBC AUTO-ENTMCNC: 31 G/DL (ref 33–37)
MCV RBC AUTO: 85 FL (ref 80–100)
METAMYELOCYTES NFR BLD MANUAL: 2 % (ref 0–0)
MONOCYTES NFR BLD: 1 % (ref 1.7–9.3)
NEUTS BAND NFR BLD: 1 % (ref 0–10)
NEUTS SEG NFR BLD MANUAL: 80 % (ref 42–75.2)
NRBC BLD AUTO-RTO: 1 % (ref 0–6)
PLATELET # BLD AUTO: 331 K/MM3 (ref 130–400)
PLATELET BLD QL SMEAR: NORMAL
PMV BLD AUTO: 10.3 FL (ref 7.4–10.4)
POTASSIUM SERPL-SCNC: 3.4 MMOL/L (ref 3.4–5)
RBC # BLD AUTO: 2.88 M/MM3 (ref 4.2–5.6)
SODIUM SERPL-SCNC: 140 MMOL/L (ref 137–145)

## 2020-10-09 NOTE — NUR
contacted Dodie at [facility] and faxed clinical updates
including second negative COVID. Dodie advised they are concerned about
patient's elevated white blood cell count. Dodie requested Hospitalist
contact Dr. Hernandez. ZABRINA provided contact information for Dr. Hernandez to Dr. Alfredo who made call. Plan is for patient's labs to be rechecked in the
morning. ZABRINA spoke with patient to provide update. ZABRINA also contacted patient's
brother, rGegg to provide update. SW to continue to follow.

## 2020-10-09 NOTE — NUR
Patient has been doing well. Continues to be weak. He was very focused on
getting some papers his friend delivered. His delivered them late this
afternoon and he has been worried about them since this morning. He did take
his potassium better today. This morning we tried IV but it burned too much so
we switched it back to oral. No complaints of pain and nausea. Patient brother
was updated about his status today. No other changes at this time. Call light
within reach.

## 2020-10-10 VITALS — SYSTOLIC BLOOD PRESSURE: 120 MMHG | HEART RATE: 72 BPM | TEMPERATURE: 98.4 F | DIASTOLIC BLOOD PRESSURE: 64 MMHG

## 2020-10-10 VITALS — TEMPERATURE: 99.3 F | SYSTOLIC BLOOD PRESSURE: 100 MMHG | DIASTOLIC BLOOD PRESSURE: 50 MMHG | HEART RATE: 88 BPM

## 2020-10-10 VITALS — SYSTOLIC BLOOD PRESSURE: 113 MMHG | DIASTOLIC BLOOD PRESSURE: 64 MMHG | HEART RATE: 105 BPM | TEMPERATURE: 98.9 F

## 2020-10-10 VITALS — TEMPERATURE: 98.6 F | HEART RATE: 85 BPM | DIASTOLIC BLOOD PRESSURE: 55 MMHG | SYSTOLIC BLOOD PRESSURE: 120 MMHG

## 2020-10-10 VITALS — HEART RATE: 86 BPM | DIASTOLIC BLOOD PRESSURE: 55 MMHG | TEMPERATURE: 98.3 F | SYSTOLIC BLOOD PRESSURE: 110 MMHG

## 2020-10-10 LAB
EOSINOPHIL NFR BLD: 1 % (ref 0–4)
ERYTHROCYTE [DISTWIDTH] IN BLOOD BY AUTOMATED COUNT: 14.6 % (ref 11.5–14.5)
HCT VFR BLD AUTO: 24.2 % (ref 42–52)
HGB BLD-MCNC: 7.5 G/DL (ref 13.5–18)
HYPOCHROMIA BLD QL SMEAR: (no result)
LYMPHOCYTES NFR BLD MANUAL: 4 % (ref 20–51)
MCH RBC QN AUTO: 26 PG (ref 27–31)
MCHC RBC AUTO-ENTMCNC: 31 G/DL (ref 33–37)
MCV RBC AUTO: 84 FL (ref 80–100)
MONOCYTES NFR BLD: 5 % (ref 1.7–9.3)
NEUTS BAND NFR BLD: 4 % (ref 0–10)
NEUTS SEG NFR BLD MANUAL: 86 % (ref 42–75.2)
PLATELET # BLD AUTO: 309 K/MM3 (ref 130–400)
PLATELET BLD QL SMEAR: NORMAL
PMV BLD AUTO: 10.4 FL (ref 7.4–10.4)
RBC # BLD AUTO: 2.88 M/MM3 (ref 4.2–5.6)

## 2020-10-10 NOTE — NUR
Patient in bed resting. Alert and oriented x 3. Assessment complete. Eccymosis
noted to right and left flank as well as scrotum. Edema to BLE +2 noted.
Patient states his right flank pain is worsening as days go by. Assisted
patient to reposition to left side. States he is having loose stools. Denies
further needs at this time.

## 2020-10-10 NOTE — NUR
Patient has done well throughout the day, Minimal needs. Requested pain
medicaiton for right flank pain this afternoon; medications given per orders.
Ice provided for flank pain as well. Denies further needs at this time. Will
report off to night shift.

## 2020-10-11 VITALS — TEMPERATURE: 99 F | SYSTOLIC BLOOD PRESSURE: 130 MMHG | DIASTOLIC BLOOD PRESSURE: 58 MMHG | HEART RATE: 90 BPM

## 2020-10-11 VITALS — SYSTOLIC BLOOD PRESSURE: 97 MMHG | HEART RATE: 56 BPM | DIASTOLIC BLOOD PRESSURE: 49 MMHG | TEMPERATURE: 98.8 F

## 2020-10-11 VITALS — HEART RATE: 54 BPM | DIASTOLIC BLOOD PRESSURE: 51 MMHG | SYSTOLIC BLOOD PRESSURE: 121 MMHG | TEMPERATURE: 99 F

## 2020-10-11 VITALS — TEMPERATURE: 99.1 F | HEART RATE: 87 BPM | SYSTOLIC BLOOD PRESSURE: 119 MMHG | DIASTOLIC BLOOD PRESSURE: 60 MMHG

## 2020-10-11 VITALS — DIASTOLIC BLOOD PRESSURE: 57 MMHG | TEMPERATURE: 98.6 F | SYSTOLIC BLOOD PRESSURE: 99 MMHG | HEART RATE: 86 BPM

## 2020-10-11 VITALS — SYSTOLIC BLOOD PRESSURE: 104 MMHG | TEMPERATURE: 97.8 F | DIASTOLIC BLOOD PRESSURE: 50 MMHG | HEART RATE: 91 BPM

## 2020-10-11 LAB
ERYTHROCYTE [DISTWIDTH] IN BLOOD BY AUTOMATED COUNT: 14.6 % (ref 11.5–14.5)
HCT VFR BLD AUTO: 23.4 % (ref 42–52)
HGB BLD-MCNC: 7.3 G/DL (ref 13.5–18)
MCH RBC QN AUTO: 26 PG (ref 27–31)
MCHC RBC AUTO-ENTMCNC: 31 G/DL (ref 33–37)
MCV RBC AUTO: 83 FL (ref 80–100)
PLATELET # BLD AUTO: 339 K/MM3 (ref 130–400)
PMV BLD AUTO: 9.8 FL (ref 7.4–10.4)
RBC # BLD AUTO: 2.83 M/MM3 (ref 4.2–5.6)

## 2020-10-11 NOTE — NUR
RESTING QUIETLY MOST OF NIGHT SHIFT.  NORCO ADMIN. AT BEGINNING OF SHIFT
CONTROLLED HIS RT FLANK PAIN.

## 2020-10-11 NOTE — NUR
Pt was on recliner while this nurse went to the pt room for bedside handover.
Helped pt to use the bedside commond and back to bed. Will keep monitoring.

## 2020-10-12 VITALS — DIASTOLIC BLOOD PRESSURE: 50 MMHG | HEART RATE: 77 BPM | TEMPERATURE: 98.6 F | SYSTOLIC BLOOD PRESSURE: 101 MMHG

## 2020-10-12 VITALS — SYSTOLIC BLOOD PRESSURE: 119 MMHG | TEMPERATURE: 98.8 F | HEART RATE: 85 BPM | DIASTOLIC BLOOD PRESSURE: 56 MMHG

## 2020-10-12 VITALS — TEMPERATURE: 98.1 F | HEART RATE: 84 BPM | DIASTOLIC BLOOD PRESSURE: 56 MMHG | SYSTOLIC BLOOD PRESSURE: 118 MMHG

## 2020-10-12 VITALS — DIASTOLIC BLOOD PRESSURE: 49 MMHG | TEMPERATURE: 98.6 F | SYSTOLIC BLOOD PRESSURE: 116 MMHG | HEART RATE: 82 BPM

## 2020-10-12 VITALS — TEMPERATURE: 98 F | HEART RATE: 87 BPM | SYSTOLIC BLOOD PRESSURE: 128 MMHG | DIASTOLIC BLOOD PRESSURE: 58 MMHG

## 2020-10-12 VITALS — DIASTOLIC BLOOD PRESSURE: 55 MMHG | HEART RATE: 79 BPM | SYSTOLIC BLOOD PRESSURE: 111 MMHG | TEMPERATURE: 98.3 F

## 2020-10-12 LAB
ANION GAP SERPL CALC-SCNC: 5 MMOL/L (ref 7–16)
BUN SERPL-MCNC: 22 MG/DL (ref 9–20)
CALCIUM SERPL-MCNC: 8.2 MG/DL (ref 8.4–10.2)
CHLORIDE SERPL-SCNC: 103 MMOL/L (ref 98–107)
CO2 SERPL-SCNC: 30 MMOL/L (ref 22–30)
CREAT SERPL-SCNC: 1.02 UMOL/L (ref 0.66–1.25)
EOSINOPHIL NFR BLD: 1 % (ref 0–4)
ERYTHROCYTE [DISTWIDTH] IN BLOOD BY AUTOMATED COUNT: 14.6 % (ref 11.5–14.5)
GLUCOSE SERPL-MCNC: 136 MG/DL (ref 74–106)
HCT VFR BLD AUTO: 26.5 % (ref 42–52)
HGB BLD-MCNC: 8.1 G/DL (ref 13.5–18)
LYMPHOCYTES NFR BLD MANUAL: 8 % (ref 20–51)
MCH RBC QN AUTO: 25 PG (ref 27–31)
MCHC RBC AUTO-ENTMCNC: 31 G/DL (ref 33–37)
MCV RBC AUTO: 83 FL (ref 80–100)
METAMYELOCYTES NFR BLD MANUAL: 3 % (ref 0–0)
MONOCYTES NFR BLD: 4 % (ref 1.7–9.3)
NEUTS BAND NFR BLD: 9 % (ref 0–10)
NEUTS SEG NFR BLD MANUAL: 75 % (ref 42–75.2)
PLATELET # BLD AUTO: 383 K/MM3 (ref 130–400)
PLATELET BLD QL SMEAR: NORMAL
PMV BLD AUTO: 9.8 FL (ref 7.4–10.4)
POTASSIUM SERPL-SCNC: 3.7 MMOL/L (ref 3.4–5)
RBC # BLD AUTO: 3.18 M/MM3 (ref 4.2–5.6)
SCHISTOCYTES BLD QL SMEAR: (no result)
SODIUM SERPL-SCNC: 138 MMOL/L (ref 137–145)
TOXIC GRANULES BLD QL SMEAR: PRESENT

## 2020-10-12 NOTE — NUR
PATIENT IS A&O. VSS. PATIENT C/O RIGHT SIDED ABD PAIN. GAVE PRN NORCO, ONE TAB
WITH AM MEDS. ABD IS DISTENDED WITH HYPO BOWL SOUNDS. NO C/O N/V. PATIENT
REPORTS HE IS PASSING GAS AND HAD A BM YESTERDAY. ABD LAP SITES CD&I WITH
GLUED CLOSURE. ORAL POTASSIUM GIVEN PER PROTOCOL. LEFT AC IV TO INT. COVID
TEST PENDING. PATIENT HOPING TO DISCHARGE TO [facility]. HEAD TO TOE ASSESSMENT
COMPLETE. AM MEDS GIVEN. CALL LIGHT IN REACH.

## 2020-10-12 NOTE — NUR
Pt assessment completed and charted, alert, oriented. Meds provided as per
MAR, tolerated well. Pt is settled on his bed, call light is on reach. No
further needs at this time.

## 2020-10-12 NOTE — NUR
The patient's WBC continues to elevate. ZABRINA notified and faxed updates to
Noelle at [facility]. ZABRINA to continue to follow.

## 2020-10-12 NOTE — NUR
PT SITTING IN CHAIR, FRAUSTRATED ABOUT RECOVERY, C/O PAIN IN ABD ONLY WHEN
MOVING SO DENIES PAIN AT THIS TIME. PM MEDS GIVEN BUT PT REQESTS HIS SLEEP
MEDS A BIT LATER. RECIEVED IN REPORT AND FROM PT THAT HE HAS BEEN HAVING LOOSE
STOOLS, THUS WILL CHART AGAINSTSTOOL SOFTENER. WILL CONTINUE TO MONITOR AND
UPDATE PROVIDERS AS NEEDED.

## 2020-10-12 NOTE — NUR
Patient has done well this afternoon. Up to recliner, continues to states pain
to right flak. Medications given per orders. Denies further needs at this
time. Will report off to night shift.

## 2020-10-13 VITALS — SYSTOLIC BLOOD PRESSURE: 100 MMHG | TEMPERATURE: 98.7 F | HEART RATE: 84 BPM | DIASTOLIC BLOOD PRESSURE: 57 MMHG

## 2020-10-13 VITALS — DIASTOLIC BLOOD PRESSURE: 58 MMHG | TEMPERATURE: 97.4 F | SYSTOLIC BLOOD PRESSURE: 132 MMHG | HEART RATE: 82 BPM

## 2020-10-13 VITALS — SYSTOLIC BLOOD PRESSURE: 115 MMHG | DIASTOLIC BLOOD PRESSURE: 55 MMHG | TEMPERATURE: 97.8 F | HEART RATE: 84 BPM

## 2020-10-13 VITALS — SYSTOLIC BLOOD PRESSURE: 104 MMHG | TEMPERATURE: 99.1 F | HEART RATE: 68 BPM | DIASTOLIC BLOOD PRESSURE: 50 MMHG

## 2020-10-13 VITALS — HEART RATE: 76 BPM | TEMPERATURE: 97.4 F | DIASTOLIC BLOOD PRESSURE: 57 MMHG | SYSTOLIC BLOOD PRESSURE: 119 MMHG

## 2020-10-13 VITALS — HEART RATE: 84 BPM | SYSTOLIC BLOOD PRESSURE: 127 MMHG | TEMPERATURE: 98.2 F | DIASTOLIC BLOOD PRESSURE: 64 MMHG

## 2020-10-13 VITALS — TEMPERATURE: 98.8 F | SYSTOLIC BLOOD PRESSURE: 114 MMHG | HEART RATE: 74 BPM | DIASTOLIC BLOOD PRESSURE: 62 MMHG

## 2020-10-13 LAB
ALBUMIN SERPL-MCNC: 2.6 GM/DL (ref 3.5–5)
ALP SERPL-CCNC: 104 U/L (ref 50–136)
ALT SERPL-CCNC: 24 U/L (ref 4–49)
ANION GAP SERPL CALC-SCNC: 2 MMOL/L (ref 7–16)
ANISOCYTOSIS BLD QL: (no result)
AST SERPL-CCNC: 50 U/L (ref 15–37)
BILIRUB SERPL-MCNC: 1.8 MG/DL (ref 0–1)
BUN SERPL-MCNC: 21 MG/DL (ref 9–20)
CALCIUM SERPL-MCNC: 7.9 MG/DL (ref 8.4–10.2)
CHLORIDE SERPL-SCNC: 104 MMOL/L (ref 98–107)
CO2 SERPL-SCNC: 30 MMOL/L (ref 22–30)
CREAT SERPL-SCNC: 0.92 UMOL/L (ref 0.66–1.25)
EOSINOPHIL NFR BLD: 1 % (ref 0–4)
ERYTHROCYTE [DISTWIDTH] IN BLOOD BY AUTOMATED COUNT: 14.6 % (ref 11.5–14.5)
GLUCOSE SERPL-MCNC: 98 MG/DL (ref 74–106)
HCT VFR BLD AUTO: 22.4 % (ref 42–52)
HCT VFR BLD AUTO: 26.5 % (ref 42–52)
HGB BLD-MCNC: 6.9 G/DL (ref 13.5–18)
HGB BLD-MCNC: 8.1 G/DL (ref 13.5–18)
LYMPHOCYTES NFR BLD MANUAL: 8 % (ref 20–51)
MCH RBC QN AUTO: 26 PG (ref 27–31)
MCHC RBC AUTO-ENTMCNC: 31 G/DL (ref 33–37)
MCV RBC AUTO: 84 FL (ref 80–100)
METAMYELOCYTES NFR BLD MANUAL: 4 % (ref 0–0)
MONOCYTES NFR BLD: 8 % (ref 1.7–9.3)
NEUTS BAND NFR BLD: 7 % (ref 0–10)
NEUTS SEG NFR BLD MANUAL: 72 % (ref 42–75.2)
PLATELET # BLD AUTO: 377 K/MM3 (ref 130–400)
PLATELET BLD QL SMEAR: NORMAL
PMV BLD AUTO: 10.2 FL (ref 7.4–10.4)
POTASSIUM SERPL-SCNC: 4.2 MMOL/L (ref 3.4–5)
PROT SERPL-MCNC: 5.5 GM/DL (ref 6.4–8.2)
RBC # BLD AUTO: 2.66 M/MM3 (ref 4.2–5.6)
SCHISTOCYTES BLD QL SMEAR: (no result)
SODIUM SERPL-SCNC: 137 MMOL/L (ref 137–145)

## 2020-10-13 NOTE — NUR
Pt assessment completed and documented. Pt resting in bed at this time
watching television. Pt alert and oriented x4. Pt denies pain at this time.
States that the only time he is in pain is when someone touches his RLQ or
when he is moving. Pt requesting PRN tylenol at this time stating that he
feels as if the PRN norco he has been getting has not been helping his pain.
INT to right wrist CDI. Pt denies any other needs at this time. Call light
within reach. Bed alarm on. Will continue to monitor.

## 2020-10-13 NOTE — NUR
Patient up to restroom with x1 assist. Had mucusy BM. Bed bath provided to
patient. Patient up to recliner. Denies further needs at this time.

## 2020-10-13 NOTE — NUR
ZABRINA staffed with the hospitalist. The patient may able to d/c in the next
couple of days. SW met with the patient to follow up and review d/c plan. The
patient states that he does not feel very perky today. He confirms plan to go
to [facility] for a skilled stay. ZABRINA contacted and faxed updates to
Noelle at [facility].

## 2020-10-13 NOTE — NUR
Patient has done well throughout the day, states he feels a little bit better
than yesterday, not having as much pain. Medications given throughout the day
per orders. Patient sat up in recliner for all meals. Tele discontinued per
orders. Denies further needs at this time. Will report off to night shift.

## 2020-10-13 NOTE — NUR
Patient sitting up in recliner eating breakfast. Alert and oriented x 3.
Assessment complete. Eccymosis to right flank, left flank, right hip, and
scrotum with multiple stages of healing. Abdomen firm, bowel sounds are
hypoactive. Edema to BLE +2, also has generalized edema +1. Denies further
needs at this time.

## 2020-10-13 NOTE — NUR
pT STATES HE IS CONCERNED THAT HIS FRIEND HAS BEEN DROPPING MAIL OFF AT THE
FRONT FOR HIM AND NO ONE HAS RETRIEVED IT, THIS RN CALLED THE FROM DESK AND
THEY SAID THEY WILL CALL ME BACK WHEN THEY KNOW MORE. WILL F/U.

## 2020-10-14 VITALS — DIASTOLIC BLOOD PRESSURE: 52 MMHG | SYSTOLIC BLOOD PRESSURE: 121 MMHG | HEART RATE: 85 BPM | TEMPERATURE: 98.7 F

## 2020-10-14 VITALS — HEART RATE: 81 BPM | SYSTOLIC BLOOD PRESSURE: 116 MMHG | TEMPERATURE: 98.4 F | DIASTOLIC BLOOD PRESSURE: 59 MMHG

## 2020-10-14 VITALS — DIASTOLIC BLOOD PRESSURE: 51 MMHG | SYSTOLIC BLOOD PRESSURE: 123 MMHG | TEMPERATURE: 98.5 F | HEART RATE: 79 BPM

## 2020-10-14 VITALS — TEMPERATURE: 98.3 F | SYSTOLIC BLOOD PRESSURE: 120 MMHG | HEART RATE: 83 BPM | DIASTOLIC BLOOD PRESSURE: 60 MMHG

## 2020-10-14 VITALS — HEART RATE: 82 BPM | TEMPERATURE: 98.4 F | SYSTOLIC BLOOD PRESSURE: 133 MMHG | DIASTOLIC BLOOD PRESSURE: 63 MMHG

## 2020-10-14 VITALS — SYSTOLIC BLOOD PRESSURE: 118 MMHG | DIASTOLIC BLOOD PRESSURE: 51 MMHG | HEART RATE: 62 BPM | TEMPERATURE: 98.3 F

## 2020-10-14 VITALS — HEART RATE: 87 BPM | TEMPERATURE: 99.1 F | SYSTOLIC BLOOD PRESSURE: 128 MMHG | DIASTOLIC BLOOD PRESSURE: 66 MMHG

## 2020-10-14 VITALS — TEMPERATURE: 98 F | SYSTOLIC BLOOD PRESSURE: 110 MMHG | DIASTOLIC BLOOD PRESSURE: 51 MMHG | HEART RATE: 79 BPM

## 2020-10-14 VITALS — DIASTOLIC BLOOD PRESSURE: 48 MMHG | HEART RATE: 87 BPM | SYSTOLIC BLOOD PRESSURE: 121 MMHG

## 2020-10-14 LAB
ANION GAP SERPL CALC-SCNC: 3 MMOL/L (ref 7–16)
BASOPHILS NFR BLD MANUAL: 1 % (ref 0–2)
BUN SERPL-MCNC: 20 MG/DL (ref 9–20)
CALCIUM SERPL-MCNC: 7.9 MG/DL (ref 8.4–10.2)
CHLORIDE SERPL-SCNC: 104 MMOL/L (ref 98–107)
CO2 SERPL-SCNC: 31 MMOL/L (ref 22–30)
CREAT SERPL-SCNC: 0.96 UMOL/L (ref 0.66–1.25)
EOSINOPHIL NFR BLD: 1 % (ref 0–4)
ERYTHROCYTE [DISTWIDTH] IN BLOOD BY AUTOMATED COUNT: 14.9 % (ref 11.5–14.5)
GLUCOSE SERPL-MCNC: 101 MG/DL (ref 74–106)
HCT VFR BLD AUTO: 22.3 % (ref 42–52)
HCT VFR BLD AUTO: 22.8 % (ref 42–52)
HGB BLD-MCNC: 6.8 G/DL (ref 13.5–18)
HGB BLD-MCNC: 7 G/DL (ref 13.5–18)
IRON SERPL-MCNC: 26 UG/DL (ref 35–150)
LYMPHOCYTES NFR BLD MANUAL: 6 % (ref 20–51)
MCH RBC QN AUTO: 26 PG (ref 27–31)
MCHC RBC AUTO-ENTMCNC: 31 G/DL (ref 33–37)
MCV RBC AUTO: 84 FL (ref 80–100)
METAMYELOCYTES NFR BLD MANUAL: 1 % (ref 0–0)
MICROCYTES BLD QL SMEAR: (no result)
MONOCYTES NFR BLD: 4 % (ref 1.7–9.3)
NEUTS BAND NFR BLD: 10 % (ref 0–10)
NEUTS SEG NFR BLD MANUAL: 77 % (ref 42–75.2)
PLATELET # BLD AUTO: 456 K/MM3 (ref 130–400)
PLATELET BLD QL SMEAR: (no result)
PMV BLD AUTO: 9.3 FL (ref 7.4–10.4)
POTASSIUM SERPL-SCNC: 3.8 MMOL/L (ref 3.4–5)
RBC # BLD AUTO: 2.65 M/MM3 (ref 4.2–5.6)
SODIUM SERPL-SCNC: 137 MMOL/L (ref 137–145)
TIBC SERPL-MCNC: 224 UG/DL (ref 261–462)

## 2020-10-14 NOTE — NUR
Pt has had two mucus consistency bowel movements overnight. Both BMs were very
clear in color. Pt also passing a lot of gas this AM with some incontinence
of bowel. Complaints of intermittent RLQ pain. PRN tylenol given per orders.
Pt sleeping earlier this morning and O2 was found at 86% on room air. Pt
placed on 1.5L O2 via nasal cannula for remainder of the night. INT changed
this AM due to leaking. Zosyn infusing per orders new INT to right wrist. Pt
denies any needs/concerns at this time. Call light within reach. Bed alarm on.
Will continue to monitor

## 2020-10-14 NOTE — NUR
ZABRINA met with the patient to review d/c plan of possible discharge tomorrow to
[facility]. The patient states that he is in agreement to the plan. He
states that he is ready to get some rehab, so that he can then return home. ZABRINA
presented and read the IM form outloud to the patient. The patient verbalized
understanding and gave ZABRINA approval to sign the form on his behalf. ZABRINA provided
him with a copy.

## 2020-10-14 NOTE — NUR
Pt. sitting up in bed at this time. Pt. is A&OX3, assessment complete.  INT to
rt. wrist patent.  Pt. reports pain at a 2 on pain scale at this time.  Pt.
does not want evening meds til 2330.  Will give at that time.  Pt. denies
further needs, call light within [facility].

## 2020-10-14 NOTE — NUR
ZABRINA attended clinical rounds. The patient may be able to d/c tomorrow, 10/15. A
repeat COVID test was ordered. ZABRINA notified and faxed updates to Noelle at
[facility]. ZABRINA contacted and updated the patient's brother, Gregg. Gregg
is in agreement with the patient going to [facility] for a skilled stay.
He had no other questions or concerns for ZABRINA at this time.

## 2020-10-14 NOTE — NUR
Patient resting in bed. He ate mac & cheese for dinner. He also had a
milkshake this afternoon & tolerated well. Patient denies the need for pain
medication. Patient resting in bed & voiding frequently after lasix when his
blood tranfusion completed which he tolerated well. Patietn hopeful lto get a
good nights rest.

## 2020-10-14 NOTE — NUR
Patient resting in bed. Got up to use the bathroom and had complaints of
feeling down. Gave patient morning medications with a pain pill. He sat up in
the chair and ate his breakfast.

## 2020-10-14 NOTE — NUR
Patient not yet ready for blood transfuion, reports being nervous.
He has never had blood. Hospitalist to see patient & discussed this more
extensive with patient. Will continue to judi.

## 2020-10-14 NOTE — NUR
rounded and explained blood transfusion. Patient felt more
comfortable with transfusion. Started blood transfusion following protocol.
Patient resting comfortably in chair.

## 2020-10-15 VITALS — TEMPERATURE: 98 F | DIASTOLIC BLOOD PRESSURE: 62 MMHG | SYSTOLIC BLOOD PRESSURE: 125 MMHG | HEART RATE: 83 BPM

## 2020-10-15 VITALS — SYSTOLIC BLOOD PRESSURE: 125 MMHG | TEMPERATURE: 98.5 F | DIASTOLIC BLOOD PRESSURE: 59 MMHG | HEART RATE: 80 BPM

## 2020-10-15 VITALS — SYSTOLIC BLOOD PRESSURE: 109 MMHG | HEART RATE: 80 BPM | DIASTOLIC BLOOD PRESSURE: 56 MMHG | TEMPERATURE: 97.6 F

## 2020-10-15 VITALS — HEART RATE: 87 BPM | SYSTOLIC BLOOD PRESSURE: 111 MMHG | DIASTOLIC BLOOD PRESSURE: 53 MMHG | TEMPERATURE: 98.3 F

## 2020-10-15 VITALS — HEART RATE: 88 BPM | TEMPERATURE: 98.3 F | DIASTOLIC BLOOD PRESSURE: 60 MMHG | SYSTOLIC BLOOD PRESSURE: 128 MMHG

## 2020-10-15 VITALS — DIASTOLIC BLOOD PRESSURE: 54 MMHG | TEMPERATURE: 98.1 F | HEART RATE: 82 BPM | SYSTOLIC BLOOD PRESSURE: 133 MMHG

## 2020-10-15 LAB
ALBUMIN SERPL-MCNC: 2.5 GM/DL (ref 3.5–5)
ALP SERPL-CCNC: 106 U/L (ref 50–136)
ALT SERPL-CCNC: 20 U/L (ref 4–49)
ANION GAP SERPL CALC-SCNC: 2 MMOL/L (ref 7–16)
AST SERPL-CCNC: 40 U/L (ref 15–37)
BILIRUB SERPL-MCNC: 1.4 MG/DL (ref 0–1)
BUN SERPL-MCNC: 17 MG/DL (ref 9–20)
CALCIUM SERPL-MCNC: 7.9 MG/DL (ref 8.4–10.2)
CHLORIDE SERPL-SCNC: 103 MMOL/L (ref 98–107)
CO2 SERPL-SCNC: 32 MMOL/L (ref 22–30)
CREAT SERPL-SCNC: 0.99 UMOL/L (ref 0.66–1.25)
EOSINOPHIL NFR BLD: 3 % (ref 0–4)
ERYTHROCYTE [DISTWIDTH] IN BLOOD BY AUTOMATED COUNT: 14.9 % (ref 11.5–14.5)
GLUCOSE SERPL-MCNC: 99 MG/DL (ref 74–106)
HCT VFR BLD AUTO: 25.1 % (ref 42–52)
HCT VFR BLD AUTO: 27.6 % (ref 42–52)
HGB BLD-MCNC: 7.8 G/DL (ref 13.5–18)
HGB BLD-MCNC: 8.5 G/DL (ref 13.5–18)
HYPOCHROMIA BLD QL SMEAR: (no result)
LYMPHOCYTES NFR BLD MANUAL: 7 % (ref 20–51)
MCH RBC QN AUTO: 26 PG (ref 27–31)
MCHC RBC AUTO-ENTMCNC: 31 G/DL (ref 33–37)
MCV RBC AUTO: 84 FL (ref 80–100)
METAMYELOCYTES NFR BLD MANUAL: 1 % (ref 0–0)
MONOCYTES NFR BLD: 9 % (ref 1.7–9.3)
MYELOCYTES NFR BLD MANUAL: 1 % (ref 0–0)
NEUTS BAND NFR BLD: 7 % (ref 0–10)
NEUTS SEG NFR BLD MANUAL: 72 % (ref 42–75.2)
PLATELET # BLD AUTO: 434 K/MM3 (ref 130–400)
PLATELET BLD QL SMEAR: (no result)
PMV BLD AUTO: 9.2 FL (ref 7.4–10.4)
POTASSIUM SERPL-SCNC: 3.8 MMOL/L (ref 3.4–5)
PROT SERPL-MCNC: 5.3 GM/DL (ref 6.4–8.2)
RBC # BLD AUTO: 2.99 M/MM3 (ref 4.2–5.6)
SODIUM SERPL-SCNC: 137 MMOL/L (ref 137–145)

## 2020-10-15 NOTE — NUR
Patient tolerated breakfast well. Now up to chair. He has no complaints of
pain and denies the need for pain medication.

## 2020-10-15 NOTE — NUR
The patient is to tentatively d/c tomorrow, 10/16. ZABRINA notified and faxed
updates and the patient's COVID results to Noelle at [facility]. ZABRINA
contacted and updated the patient's brother, Gregg. Gregg is in agreement with
the patient going to [facility]. SW to continue to follow.

## 2020-10-16 VITALS — SYSTOLIC BLOOD PRESSURE: 138 MMHG | TEMPERATURE: 97.9 F | DIASTOLIC BLOOD PRESSURE: 67 MMHG | HEART RATE: 85 BPM

## 2020-10-16 VITALS — SYSTOLIC BLOOD PRESSURE: 128 MMHG | HEART RATE: 82 BPM | TEMPERATURE: 98.3 F | DIASTOLIC BLOOD PRESSURE: 61 MMHG

## 2020-10-16 VITALS — HEART RATE: 82 BPM | TEMPERATURE: 98.1 F | DIASTOLIC BLOOD PRESSURE: 58 MMHG | SYSTOLIC BLOOD PRESSURE: 117 MMHG

## 2020-10-16 VITALS — SYSTOLIC BLOOD PRESSURE: 129 MMHG | HEART RATE: 82 BPM | DIASTOLIC BLOOD PRESSURE: 59 MMHG | TEMPERATURE: 98.1 F

## 2020-10-16 VITALS — DIASTOLIC BLOOD PRESSURE: 59 MMHG | SYSTOLIC BLOOD PRESSURE: 129 MMHG | TEMPERATURE: 98.1 F | HEART RATE: 82 BPM

## 2020-10-16 LAB
ANION GAP SERPL CALC-SCNC: 2 MMOL/L (ref 7–16)
ANISOCYTOSIS BLD QL: (no result)
BUN SERPL-MCNC: 15 MG/DL (ref 9–20)
CALCIUM SERPL-MCNC: 8.2 MG/DL (ref 8.4–10.2)
CHLORIDE SERPL-SCNC: 102 MMOL/L (ref 98–107)
CO2 SERPL-SCNC: 34 MMOL/L (ref 22–30)
CREAT SERPL-SCNC: 0.91 UMOL/L (ref 0.66–1.25)
EOSINOPHIL NFR BLD: 3 % (ref 0–4)
ERYTHROCYTE [DISTWIDTH] IN BLOOD BY AUTOMATED COUNT: 15.1 % (ref 11.5–14.5)
GLUCOSE SERPL-MCNC: 97 MG/DL (ref 74–106)
HCT VFR BLD AUTO: 26.3 % (ref 42–52)
HGB BLD-MCNC: 8.1 G/DL (ref 13.5–18)
HYPOCHROMIA BLD QL SMEAR: (no result)
LYMPHOCYTES NFR BLD MANUAL: 6 % (ref 20–51)
MCH RBC QN AUTO: 26 PG (ref 27–31)
MCHC RBC AUTO-ENTMCNC: 31 G/DL (ref 33–37)
MCV RBC AUTO: 85 FL (ref 80–100)
MONOCYTES NFR BLD: 9 % (ref 1.7–9.3)
NEUTS SEG NFR BLD MANUAL: 82 % (ref 42–75.2)
PLATELET # BLD AUTO: 462 K/MM3 (ref 130–400)
PLATELET BLD QL SMEAR: (no result)
PMV BLD AUTO: 9.2 FL (ref 7.4–10.4)
POTASSIUM SERPL-SCNC: 3.6 MMOL/L (ref 3.4–5)
RBC # BLD AUTO: 3.11 M/MM3 (ref 4.2–5.6)
SODIUM SERPL-SCNC: 139 MMOL/L (ref 137–145)

## 2020-10-16 NOTE — NUR
Pt currently sitting up in his chair. Pt had a very good night. Pt slept well
during the night. Pt did request to have his sleeping medication later and
this was given at that time. Pt has had no complaints of pain and requested to
be bothered as little as possible. I did inform him that we would do our best
to only come in as needed. He has his call light within reach.

## 2020-10-16 NOTE — NUR
Patient up to chair. Patient states that pain is tolerable. IV began leaking
when flushing it, so it was discontinued. We did not start another IV per
doctors orders.

## 2020-10-16 NOTE — NUR
Patient ready for discharge. Assisted to get dressed, he shaved. Patient sent
with all belongings, report called to deneen medina and all questions
answered.

## 2020-10-16 NOTE — NUR
collaborated with Hospitalist and patient is ready to be
discharged. ZABRINA contacted [facility] who after speaking with Dr. Hernandez,
advised they can accept today. ZABRINA faxed clinical updates and discharge orders
to [facility] then set transport time for 1430. ZABRINA met with patient and provided
transport time. ZABRINA also contacted patient's brother, Gregg to provide transport
time. Both patient and Gregg are in agreement with discharge plan. No
additional needs at this time.